# Patient Record
Sex: MALE | Race: WHITE | NOT HISPANIC OR LATINO
[De-identification: names, ages, dates, MRNs, and addresses within clinical notes are randomized per-mention and may not be internally consistent; named-entity substitution may affect disease eponyms.]

---

## 2019-11-21 ENCOUNTER — APPOINTMENT (OUTPATIENT)
Dept: FAMILY MEDICINE | Facility: CLINIC | Age: 84
End: 2019-11-21
Payer: MEDICARE

## 2019-11-21 VITALS
WEIGHT: 186 LBS | BODY MASS INDEX: 25.19 KG/M2 | DIASTOLIC BLOOD PRESSURE: 78 MMHG | HEART RATE: 62 BPM | OXYGEN SATURATION: 95 % | HEIGHT: 72 IN | RESPIRATION RATE: 16 BRPM | SYSTOLIC BLOOD PRESSURE: 132 MMHG

## 2019-11-21 DIAGNOSIS — Z00.00 ENCOUNTER FOR GENERAL ADULT MEDICAL EXAMINATION W/OUT ABNORMAL FINDINGS: ICD-10-CM

## 2019-11-21 DIAGNOSIS — B35.1 TINEA UNGUIUM: ICD-10-CM

## 2019-11-21 PROCEDURE — G0439: CPT

## 2019-11-21 RX ORDER — CICLOPIROX 80 MG/ML
8 SOLUTION TOPICAL TWICE DAILY
Qty: 2 | Refills: 3 | Status: ACTIVE | COMMUNITY
Start: 2019-11-21 | End: 1900-01-01

## 2019-11-21 NOTE — PHYSICAL EXAM
[Supple] : supple [Normal] : no respiratory distress, lungs were clear to auscultation bilaterally and no accessory muscle use

## 2019-11-21 NOTE — ASSESSMENT
[FreeTextEntry1] : Establish care\par Podistry Referral\par [facility] get [facility] that [facility] done at [facility] months ago\par Hx of fall last year. Scalp laceration. Reports ? Intracranial bleed. Seen by Dr Orlando in [place]. Will provide records.

## 2019-11-21 NOTE — HISTORY OF PRESENT ILLNESS
[FreeTextEntry1] : establish care [de-identified] : 89 yr old male here with son and daughter to establish care\cris Needs referral to get Podiatry in the house for home visits.\cris Used to go to [facility] until about a year and half.\cris Has full time aide in home.\par Wife passed away 1 1/2 yr ago\cris Nurse and PA from [facility] come [facility].\par

## 2021-02-17 ENCOUNTER — APPOINTMENT (OUTPATIENT)
Dept: FAMILY MEDICINE | Facility: CLINIC | Age: 86
End: 2021-02-17
Payer: MEDICARE

## 2021-02-17 VITALS
OXYGEN SATURATION: 97 % | SYSTOLIC BLOOD PRESSURE: 130 MMHG | BODY MASS INDEX: 25.47 KG/M2 | HEART RATE: 55 BPM | HEIGHT: 72 IN | RESPIRATION RATE: 14 BRPM | WEIGHT: 188 LBS | TEMPERATURE: 97.6 F | DIASTOLIC BLOOD PRESSURE: 62 MMHG

## 2021-02-17 DIAGNOSIS — F02.80 ALZHEIMER'S DISEASE, UNSPECIFIED: ICD-10-CM

## 2021-02-17 DIAGNOSIS — F03.90 UNSPECIFIED DEMENTIA W/OUT BEHAVIORAL DISTURBANCE: ICD-10-CM

## 2021-02-17 DIAGNOSIS — G30.9 ALZHEIMER'S DISEASE, UNSPECIFIED: ICD-10-CM

## 2021-02-17 DIAGNOSIS — H61.23 IMPACTED CERUMEN, BILATERAL: ICD-10-CM

## 2021-02-17 PROCEDURE — 36415 COLL VENOUS BLD VENIPUNCTURE: CPT

## 2021-02-17 PROCEDURE — G0442 ANNUAL ALCOHOL SCREEN 15 MIN: CPT

## 2021-02-17 PROCEDURE — G0439: CPT

## 2021-02-17 PROCEDURE — 99072 ADDL SUPL MATRL&STAF TM PHE: CPT

## 2021-02-17 NOTE — HISTORY OF PRESENT ILLNESS
[FreeTextEntry1] : annual [de-identified] : Mr. VINCENZO SAUNDERS is a 89 year old male presenting for a check up.\par Here with Daughter who is HCP.\par

## 2021-02-17 NOTE — PHYSICAL EXAM
[Supple] : supple [Normal Rate] : normal rate  [Regular Rhythm] : with a regular rhythm [Normal S1, S2] : normal S1 and S2 [No Edema] : there was no peripheral edema [Normal] : no CVA or spinal tenderness [No Rash] : no rash [Coordination Grossly Intact] : coordination grossly intact [Normal Affect] : the affect was normal [Normal Gait] : normal gait [de-identified] : cerumen impaction b/l [de-identified] : grade 2 murmur [de-identified] : multiple AK/ SK lesions

## 2021-02-17 NOTE — HEALTH RISK ASSESSMENT
[0] : 2) Feeling down, depressed, or hopeless: Not at all (0) [Alone] : lives alone [] :  [Feels Safe at Home] : Feels safe at home [Reports changes in hearing] : Reports changes in hearing [Smoke Detector] : smoke detector [Carbon Monoxide Detector] : carbon monoxide detector [Seat Belt] :  uses seat belt [Reviewed no changes] : Reviewed no changes [Name: ___] : Health Care Proxy's Name: [unfilled]  [Relationship: ___] : Relationship: [unfilled] [Fair] : ~his/her~ current health as fair  [Good] : ~his/her~  mood as  good [No] : In the past 12 months have you used drugs other than those required for medical reasons? No [No falls in past year] : Patient reported no falls in the past year [Change in mental status noted] : Change in mental status noted [Behavioral] : behavioral [Retired] : retired [Fully functional (bathing, dressing, toileting, transferring, walking, feeding)] : Fully functional (bathing, dressing, toileting, transferring, walking, feeding) [] : No [Audit-CScore] : 0 [Sexually Active] : not sexually active [Reports changes in vision] : Reports no changes in vision [Reports changes in dental health] : Reports no changes in dental health [Sunscreen] : does not use sunscreen [de-identified] : has full time aide [de-identified] : has dementia [de-identified] : has full time aide, children paying bills and managing finances [AdvancecareDate] : 2/2021

## 2022-07-05 ENCOUNTER — NON-APPOINTMENT (OUTPATIENT)
Age: 87
End: 2022-07-05

## 2024-01-01 ENCOUNTER — APPOINTMENT (OUTPATIENT)
Dept: UROLOGY | Facility: CLINIC | Age: 89
End: 2024-01-01

## 2024-01-01 ENCOUNTER — INPATIENT (INPATIENT)
Facility: HOSPITAL | Age: 89
LOS: 8 days | DRG: 310 | End: 2024-10-31
Attending: INTERNAL MEDICINE | Admitting: INTERNAL MEDICINE
Payer: MEDICARE

## 2024-01-01 VITALS
DIASTOLIC BLOOD PRESSURE: 91 MMHG | SYSTOLIC BLOOD PRESSURE: 126 MMHG | HEART RATE: 94 BPM | RESPIRATION RATE: 17 BRPM | OXYGEN SATURATION: 93 % | TEMPERATURE: 98 F | HEIGHT: 72 IN

## 2024-01-01 VITALS
RESPIRATION RATE: 14 BRPM | DIASTOLIC BLOOD PRESSURE: 51 MMHG | HEART RATE: 72 BPM | SYSTOLIC BLOOD PRESSURE: 105 MMHG | OXYGEN SATURATION: 99 %

## 2024-01-01 DIAGNOSIS — R09.89 OTHER SPECIFIED SYMPTOMS AND SIGNS INVOLVING THE CIRCULATORY AND RESPIRATORY SYSTEMS: ICD-10-CM

## 2024-01-01 DIAGNOSIS — I48.0 PAROXYSMAL ATRIAL FIBRILLATION: ICD-10-CM

## 2024-01-01 DIAGNOSIS — I48.91 UNSPECIFIED ATRIAL FIBRILLATION: ICD-10-CM

## 2024-01-01 DIAGNOSIS — U07.1 COVID-19: ICD-10-CM

## 2024-01-01 LAB
A1C WITH ESTIMATED AVERAGE GLUCOSE RESULT: 5.6 % — SIGNIFICANT CHANGE UP (ref 4–5.6)
ALBUMIN SERPL ELPH-MCNC: 1.9 G/DL — LOW (ref 3.3–5)
ALBUMIN SERPL ELPH-MCNC: 2 G/DL — LOW (ref 3.3–5)
ALBUMIN SERPL ELPH-MCNC: 2.1 G/DL — LOW (ref 3.3–5)
ALBUMIN SERPL ELPH-MCNC: 2.2 G/DL — LOW (ref 3.3–5)
ALBUMIN SERPL ELPH-MCNC: 2.5 G/DL — LOW (ref 3.3–5)
ALP SERPL-CCNC: 116 U/L — SIGNIFICANT CHANGE UP (ref 40–120)
ALP SERPL-CCNC: 120 U/L — SIGNIFICANT CHANGE UP (ref 40–120)
ALP SERPL-CCNC: 125 U/L — HIGH (ref 40–120)
ALP SERPL-CCNC: 78 U/L — SIGNIFICANT CHANGE UP (ref 40–120)
ALP SERPL-CCNC: 81 U/L — SIGNIFICANT CHANGE UP (ref 40–120)
ALP SERPL-CCNC: 90 U/L — SIGNIFICANT CHANGE UP (ref 40–120)
ALP SERPL-CCNC: 97 U/L — SIGNIFICANT CHANGE UP (ref 40–120)
ALT FLD-CCNC: 109 U/L — HIGH (ref 12–78)
ALT FLD-CCNC: 128 U/L — HIGH (ref 12–78)
ALT FLD-CCNC: 142 U/L — HIGH (ref 12–78)
ALT FLD-CCNC: 166 U/L — HIGH (ref 12–78)
ALT FLD-CCNC: 177 U/L — HIGH (ref 12–78)
ALT FLD-CCNC: 62 U/L — SIGNIFICANT CHANGE UP (ref 12–78)
ALT FLD-CCNC: 72 U/L — SIGNIFICANT CHANGE UP (ref 12–78)
ANION GAP SERPL CALC-SCNC: 10 MMOL/L — SIGNIFICANT CHANGE UP (ref 5–17)
ANION GAP SERPL CALC-SCNC: 5 MMOL/L — SIGNIFICANT CHANGE UP (ref 5–17)
ANION GAP SERPL CALC-SCNC: 5 MMOL/L — SIGNIFICANT CHANGE UP (ref 5–17)
ANION GAP SERPL CALC-SCNC: 6 MMOL/L — SIGNIFICANT CHANGE UP (ref 5–17)
ANION GAP SERPL CALC-SCNC: 8 MMOL/L — SIGNIFICANT CHANGE UP (ref 5–17)
ANION GAP SERPL CALC-SCNC: 9 MMOL/L — SIGNIFICANT CHANGE UP (ref 5–17)
APPEARANCE UR: ABNORMAL
APPEARANCE UR: ABNORMAL
APTT BLD: 33 SEC — SIGNIFICANT CHANGE UP (ref 24.5–35.6)
APTT BLD: 39.2 SEC — HIGH (ref 24.5–35.6)
AST SERPL-CCNC: 116 U/L — HIGH (ref 15–37)
AST SERPL-CCNC: 125 U/L — HIGH (ref 15–37)
AST SERPL-CCNC: 129 U/L — HIGH (ref 15–37)
AST SERPL-CCNC: 147 U/L — HIGH (ref 15–37)
AST SERPL-CCNC: 59 U/L — HIGH (ref 15–37)
AST SERPL-CCNC: 95 U/L — HIGH (ref 15–37)
AST SERPL-CCNC: 97 U/L — HIGH (ref 15–37)
BACTERIA # UR AUTO: ABNORMAL /HPF
BACTERIA # UR AUTO: NEGATIVE /HPF — SIGNIFICANT CHANGE UP
BASOPHILS # BLD AUTO: 0 K/UL — SIGNIFICANT CHANGE UP (ref 0–0.2)
BASOPHILS # BLD AUTO: 0 K/UL — SIGNIFICANT CHANGE UP (ref 0–0.2)
BASOPHILS # BLD AUTO: 0.02 K/UL — SIGNIFICANT CHANGE UP (ref 0–0.2)
BASOPHILS NFR BLD AUTO: 0 % — SIGNIFICANT CHANGE UP (ref 0–2)
BASOPHILS NFR BLD AUTO: 0 % — SIGNIFICANT CHANGE UP (ref 0–2)
BASOPHILS NFR BLD AUTO: 0.2 % — SIGNIFICANT CHANGE UP (ref 0–2)
BILIRUB DIRECT SERPL-MCNC: 0.1 MG/DL — SIGNIFICANT CHANGE UP (ref 0–0.3)
BILIRUB DIRECT SERPL-MCNC: 0.1 MG/DL — SIGNIFICANT CHANGE UP (ref 0–0.3)
BILIRUB DIRECT SERPL-MCNC: 0.2 MG/DL — SIGNIFICANT CHANGE UP (ref 0–0.3)
BILIRUB DIRECT SERPL-MCNC: 0.3 MG/DL — SIGNIFICANT CHANGE UP (ref 0–0.3)
BILIRUB DIRECT SERPL-MCNC: 0.5 MG/DL — HIGH (ref 0–0.3)
BILIRUB INDIRECT FLD-MCNC: 0.3 MG/DL — SIGNIFICANT CHANGE UP (ref 0.2–1)
BILIRUB INDIRECT FLD-MCNC: 0.4 MG/DL — SIGNIFICANT CHANGE UP (ref 0.2–1)
BILIRUB INDIRECT FLD-MCNC: 0.4 MG/DL — SIGNIFICANT CHANGE UP (ref 0.2–1)
BILIRUB INDIRECT FLD-MCNC: 0.5 MG/DL — SIGNIFICANT CHANGE UP (ref 0.2–1)
BILIRUB INDIRECT FLD-MCNC: 0.7 MG/DL — SIGNIFICANT CHANGE UP (ref 0.2–1)
BILIRUB SERPL-MCNC: 0.5 MG/DL — SIGNIFICANT CHANGE UP (ref 0.2–1.2)
BILIRUB SERPL-MCNC: 0.5 MG/DL — SIGNIFICANT CHANGE UP (ref 0.2–1.2)
BILIRUB SERPL-MCNC: 0.6 MG/DL — SIGNIFICANT CHANGE UP (ref 0.2–1.2)
BILIRUB SERPL-MCNC: 0.6 MG/DL — SIGNIFICANT CHANGE UP (ref 0.2–1.2)
BILIRUB SERPL-MCNC: 0.7 MG/DL — SIGNIFICANT CHANGE UP (ref 0.2–1.2)
BILIRUB SERPL-MCNC: 1.2 MG/DL — SIGNIFICANT CHANGE UP (ref 0.2–1.2)
BILIRUB SERPL-MCNC: 2.3 MG/DL — HIGH (ref 0.2–1.2)
BILIRUB UR-MCNC: NEGATIVE — SIGNIFICANT CHANGE UP
BILIRUB UR-MCNC: NEGATIVE — SIGNIFICANT CHANGE UP
BLD GP AB SCN SERPL QL: SIGNIFICANT CHANGE UP
BUN SERPL-MCNC: 136 MG/DL — HIGH (ref 7–23)
BUN SERPL-MCNC: 148 MG/DL — HIGH (ref 7–23)
BUN SERPL-MCNC: 157 MG/DL — HIGH (ref 7–23)
BUN SERPL-MCNC: 175 MG/DL — HIGH (ref 7–23)
BUN SERPL-MCNC: 24 MG/DL — HIGH (ref 7–23)
BUN SERPL-MCNC: 47 MG/DL — HIGH (ref 7–23)
BUN SERPL-MCNC: 75 MG/DL — HIGH (ref 7–23)
BUN SERPL-MCNC: 96 MG/DL — HIGH (ref 7–23)
CALCIUM SERPL-MCNC: 7.8 MG/DL — LOW (ref 8.5–10.1)
CALCIUM SERPL-MCNC: 8.2 MG/DL — LOW (ref 8.5–10.1)
CALCIUM SERPL-MCNC: 8.8 MG/DL — SIGNIFICANT CHANGE UP (ref 8.5–10.1)
CALCIUM SERPL-MCNC: 9 MG/DL — SIGNIFICANT CHANGE UP (ref 8.5–10.1)
CAST: 1 /LPF — SIGNIFICANT CHANGE UP (ref 0–4)
CAST: 15 /LPF — HIGH (ref 0–4)
CHLORIDE SERPL-SCNC: 110 MMOL/L — HIGH (ref 96–108)
CHLORIDE SERPL-SCNC: 113 MMOL/L — HIGH (ref 96–108)
CHLORIDE SERPL-SCNC: 118 MMOL/L — HIGH (ref 96–108)
CHLORIDE SERPL-SCNC: 122 MMOL/L — HIGH (ref 96–108)
CHLORIDE SERPL-SCNC: 124 MMOL/L — HIGH (ref 96–108)
CHLORIDE SERPL-SCNC: 124 MMOL/L — HIGH (ref 96–108)
CHLORIDE SERPL-SCNC: 126 MMOL/L — HIGH (ref 96–108)
CHLORIDE SERPL-SCNC: 129 MMOL/L — HIGH (ref 96–108)
CK SERPL-CCNC: 41 U/L — SIGNIFICANT CHANGE UP (ref 26–308)
CO2 SERPL-SCNC: 18 MMOL/L — LOW (ref 22–31)
CO2 SERPL-SCNC: 19 MMOL/L — LOW (ref 22–31)
CO2 SERPL-SCNC: 19 MMOL/L — LOW (ref 22–31)
CO2 SERPL-SCNC: 20 MMOL/L — LOW (ref 22–31)
CO2 SERPL-SCNC: 22 MMOL/L — SIGNIFICANT CHANGE UP (ref 22–31)
CO2 SERPL-SCNC: 22 MMOL/L — SIGNIFICANT CHANGE UP (ref 22–31)
CO2 SERPL-SCNC: 24 MMOL/L — SIGNIFICANT CHANGE UP (ref 22–31)
CO2 SERPL-SCNC: 27 MMOL/L — SIGNIFICANT CHANGE UP (ref 22–31)
COLOR SPEC: YELLOW — SIGNIFICANT CHANGE UP
COLOR SPEC: YELLOW — SIGNIFICANT CHANGE UP
COMMENT - URINE: SIGNIFICANT CHANGE UP
CREAT SERPL-MCNC: 1.25 MG/DL — SIGNIFICANT CHANGE UP (ref 0.5–1.3)
CREAT SERPL-MCNC: 1.26 MG/DL — SIGNIFICANT CHANGE UP (ref 0.5–1.3)
CREAT SERPL-MCNC: 1.5 MG/DL — HIGH (ref 0.5–1.3)
CREAT SERPL-MCNC: 1.69 MG/DL — HIGH (ref 0.5–1.3)
CREAT SERPL-MCNC: 1.73 MG/DL — HIGH (ref 0.5–1.3)
CREAT SERPL-MCNC: 1.98 MG/DL — HIGH (ref 0.5–1.3)
CREAT SERPL-MCNC: 2.18 MG/DL — HIGH (ref 0.5–1.3)
CREAT SERPL-MCNC: 2.37 MG/DL — HIGH (ref 0.5–1.3)
CREAT SERPL-MCNC: 2.68 MG/DL — HIGH (ref 0.5–1.3)
CREAT SERPL-MCNC: 2.78 MG/DL — HIGH (ref 0.5–1.3)
CRP SERPL-MCNC: 144 MG/ML — HIGH (ref 0–5)
CULTURE RESULTS: SIGNIFICANT CHANGE UP
CULTURE RESULTS: SIGNIFICANT CHANGE UP
D DIMER BLD IA.RAPID-MCNC: 1901 NG/ML DDU — HIGH
D DIMER BLD IA.RAPID-MCNC: 2204 NG/ML DDU — HIGH
DIFF PNL FLD: ABNORMAL
DIFF PNL FLD: ABNORMAL
EGFR: 20 ML/MIN/1.73M2 — LOW
EGFR: 21 ML/MIN/1.73M2 — LOW
EGFR: 24 ML/MIN/1.73M2 — LOW
EGFR: 27 ML/MIN/1.73M2 — LOW
EGFR: 30 ML/MIN/1.73M2 — LOW
EGFR: 35 ML/MIN/1.73M2 — LOW
EGFR: 36 ML/MIN/1.73M2 — LOW
EGFR: 42 ML/MIN/1.73M2 — LOW
EGFR: 52 ML/MIN/1.73M2 — LOW
EGFR: 52 ML/MIN/1.73M2 — LOW
EOSINOPHIL # BLD AUTO: 0 K/UL — SIGNIFICANT CHANGE UP (ref 0–0.5)
EOSINOPHIL # BLD AUTO: 0 K/UL — SIGNIFICANT CHANGE UP (ref 0–0.5)
EOSINOPHIL # BLD AUTO: 0.07 K/UL — SIGNIFICANT CHANGE UP (ref 0–0.5)
EOSINOPHIL NFR BLD AUTO: 0 % — SIGNIFICANT CHANGE UP (ref 0–6)
EOSINOPHIL NFR BLD AUTO: 0 % — SIGNIFICANT CHANGE UP (ref 0–6)
EOSINOPHIL NFR BLD AUTO: 0.7 % — SIGNIFICANT CHANGE UP (ref 0–6)
ESTIMATED AVERAGE GLUCOSE: 114 MG/DL — SIGNIFICANT CHANGE UP (ref 68–114)
FERRITIN SERPL-MCNC: 1175 NG/ML — HIGH (ref 30–400)
FLUAV AG NPH QL: SIGNIFICANT CHANGE UP
FLUBV AG NPH QL: SIGNIFICANT CHANGE UP
GLUCOSE BLDC GLUCOMTR-MCNC: 148 MG/DL — HIGH (ref 70–99)
GLUCOSE BLDC GLUCOMTR-MCNC: 155 MG/DL — HIGH (ref 70–99)
GLUCOSE BLDC GLUCOMTR-MCNC: 162 MG/DL — HIGH (ref 70–99)
GLUCOSE SERPL-MCNC: 123 MG/DL — HIGH (ref 70–99)
GLUCOSE SERPL-MCNC: 133 MG/DL — HIGH (ref 70–99)
GLUCOSE SERPL-MCNC: 139 MG/DL — HIGH (ref 70–99)
GLUCOSE SERPL-MCNC: 140 MG/DL — HIGH (ref 70–99)
GLUCOSE SERPL-MCNC: 151 MG/DL — HIGH (ref 70–99)
GLUCOSE SERPL-MCNC: 156 MG/DL — HIGH (ref 70–99)
GLUCOSE SERPL-MCNC: 171 MG/DL — HIGH (ref 70–99)
GLUCOSE SERPL-MCNC: 179 MG/DL — HIGH (ref 70–99)
GLUCOSE UR QL: NEGATIVE MG/DL — SIGNIFICANT CHANGE UP
GLUCOSE UR QL: NEGATIVE MG/DL — SIGNIFICANT CHANGE UP
HAV IGM SER-ACNC: SIGNIFICANT CHANGE UP
HBV CORE IGM SER-ACNC: SIGNIFICANT CHANGE UP
HBV SURFACE AG SER-ACNC: SIGNIFICANT CHANGE UP
HCT VFR BLD CALC: 19.8 % — CRITICAL LOW (ref 39–50)
HCT VFR BLD CALC: 20 % — CRITICAL LOW (ref 39–50)
HCT VFR BLD CALC: 33.1 % — LOW (ref 39–50)
HCT VFR BLD CALC: 37 % — LOW (ref 39–50)
HCT VFR BLD CALC: 40.1 % — SIGNIFICANT CHANGE UP (ref 39–50)
HCT VFR BLD CALC: 43.1 % — SIGNIFICANT CHANGE UP (ref 39–50)
HCV AB S/CO SERPL IA: 0.09 S/CO — SIGNIFICANT CHANGE UP (ref 0–0.99)
HCV AB SERPL-IMP: SIGNIFICANT CHANGE UP
HGB BLD-MCNC: 10.6 G/DL — LOW (ref 13–17)
HGB BLD-MCNC: 11.9 G/DL — LOW (ref 13–17)
HGB BLD-MCNC: 12.8 G/DL — LOW (ref 13–17)
HGB BLD-MCNC: 13.8 G/DL — SIGNIFICANT CHANGE UP (ref 13–17)
HGB BLD-MCNC: 6.5 G/DL — CRITICAL LOW (ref 13–17)
HGB BLD-MCNC: 6.6 G/DL — CRITICAL LOW (ref 13–17)
HIV 1 & 2 AB SERPL IA.RAPID: SIGNIFICANT CHANGE UP
HIV 1+2 AB+HIV1 P24 AG SERPL QL IA: SIGNIFICANT CHANGE UP
IMM GRANULOCYTES NFR BLD AUTO: 0.4 % — SIGNIFICANT CHANGE UP (ref 0–0.9)
INR BLD: 1.29 RATIO — HIGH (ref 0.85–1.16)
INR BLD: 1.29 RATIO — HIGH (ref 0.85–1.16)
INR BLD: 1.31 RATIO — HIGH (ref 0.85–1.16)
INR BLD: 1.49 RATIO — HIGH (ref 0.85–1.16)
INR BLD: 1.53 RATIO — HIGH (ref 0.85–1.16)
INR BLD: 1.57 RATIO — HIGH (ref 0.85–1.16)
INR BLD: 1.74 RATIO — HIGH (ref 0.85–1.16)
INR BLD: 1.82 RATIO — HIGH (ref 0.85–1.16)
INR BLD: 1.82 RATIO — HIGH (ref 0.85–1.16)
KETONES UR-MCNC: NEGATIVE MG/DL — SIGNIFICANT CHANGE UP
KETONES UR-MCNC: NEGATIVE MG/DL — SIGNIFICANT CHANGE UP
LACTATE SERPL-SCNC: 1.8 MMOL/L — SIGNIFICANT CHANGE UP (ref 0.7–2)
LACTATE SERPL-SCNC: 2.9 MMOL/L — HIGH (ref 0.7–2)
LACTATE SERPL-SCNC: 3.1 MMOL/L — HIGH (ref 0.7–2)
LEUKOCYTE ESTERASE UR-ACNC: ABNORMAL
LEUKOCYTE ESTERASE UR-ACNC: ABNORMAL
LYMPHOCYTES # BLD AUTO: 0.64 K/UL — LOW (ref 1–3.3)
LYMPHOCYTES # BLD AUTO: 12 % — LOW (ref 13–44)
LYMPHOCYTES # BLD AUTO: 14 % — SIGNIFICANT CHANGE UP (ref 13–44)
LYMPHOCYTES # BLD AUTO: 3.04 K/UL — SIGNIFICANT CHANGE UP (ref 1–3.3)
LYMPHOCYTES # BLD AUTO: 3.28 K/UL — SIGNIFICANT CHANGE UP (ref 1–3.3)
LYMPHOCYTES # BLD AUTO: 6.2 % — LOW (ref 13–44)
MACROCYTES BLD QL: SLIGHT — SIGNIFICANT CHANGE UP
MAGNESIUM SERPL-MCNC: 2.5 MG/DL — SIGNIFICANT CHANGE UP (ref 1.6–2.6)
MAGNESIUM SERPL-MCNC: 2.5 MG/DL — SIGNIFICANT CHANGE UP (ref 1.6–2.6)
MAGNESIUM SERPL-MCNC: 2.6 MG/DL — SIGNIFICANT CHANGE UP (ref 1.6–2.6)
MAGNESIUM SERPL-MCNC: 2.6 MG/DL — SIGNIFICANT CHANGE UP (ref 1.6–2.6)
MAGNESIUM SERPL-MCNC: 2.8 MG/DL — HIGH (ref 1.6–2.6)
MANUAL SMEAR VERIFICATION: SIGNIFICANT CHANGE UP
MANUAL SMEAR VERIFICATION: SIGNIFICANT CHANGE UP
MCHC RBC-ENTMCNC: 29.9 PG — SIGNIFICANT CHANGE UP (ref 27–34)
MCHC RBC-ENTMCNC: 30.1 PG — SIGNIFICANT CHANGE UP (ref 27–34)
MCHC RBC-ENTMCNC: 30.7 PG — SIGNIFICANT CHANGE UP (ref 27–34)
MCHC RBC-ENTMCNC: 31.3 PG — SIGNIFICANT CHANGE UP (ref 27–34)
MCHC RBC-ENTMCNC: 31.9 GM/DL — LOW (ref 32–36)
MCHC RBC-ENTMCNC: 32 GM/DL — SIGNIFICANT CHANGE UP (ref 32–36)
MCHC RBC-ENTMCNC: 32 GM/DL — SIGNIFICANT CHANGE UP (ref 32–36)
MCHC RBC-ENTMCNC: 32.2 GM/DL — SIGNIFICANT CHANGE UP (ref 32–36)
MCHC RBC-ENTMCNC: 32.5 GM/DL — SIGNIFICANT CHANGE UP (ref 32–36)
MCHC RBC-ENTMCNC: 33.3 GM/DL — SIGNIFICANT CHANGE UP (ref 32–36)
MCV RBC AUTO: 93.7 FL — SIGNIFICANT CHANGE UP (ref 80–100)
MCV RBC AUTO: 93.7 FL — SIGNIFICANT CHANGE UP (ref 80–100)
MCV RBC AUTO: 93.8 FL — SIGNIFICANT CHANGE UP (ref 80–100)
MCV RBC AUTO: 93.9 FL — SIGNIFICANT CHANGE UP (ref 80–100)
MCV RBC AUTO: 94 FL — SIGNIFICANT CHANGE UP (ref 80–100)
MCV RBC AUTO: 94.3 FL — SIGNIFICANT CHANGE UP (ref 80–100)
MONOCYTES # BLD AUTO: 0.76 K/UL — SIGNIFICANT CHANGE UP (ref 0–0.9)
MONOCYTES # BLD AUTO: 0.83 K/UL — SIGNIFICANT CHANGE UP (ref 0–0.9)
MONOCYTES # BLD AUTO: 1.64 K/UL — HIGH (ref 0–0.9)
MONOCYTES NFR BLD AUTO: 3 % — SIGNIFICANT CHANGE UP (ref 2–14)
MONOCYTES NFR BLD AUTO: 7 % — SIGNIFICANT CHANGE UP (ref 2–14)
MONOCYTES NFR BLD AUTO: 8.1 % — SIGNIFICANT CHANGE UP (ref 2–14)
MRSA PCR RESULT.: SIGNIFICANT CHANGE UP
NEUTROPHILS # BLD AUTO: 18.51 K/UL — HIGH (ref 1.8–7.4)
NEUTROPHILS # BLD AUTO: 21.51 K/UL — HIGH (ref 1.8–7.4)
NEUTROPHILS # BLD AUTO: 8.66 K/UL — HIGH (ref 1.8–7.4)
NEUTROPHILS NFR BLD AUTO: 78 % — HIGH (ref 43–77)
NEUTROPHILS NFR BLD AUTO: 84.4 % — HIGH (ref 43–77)
NEUTROPHILS NFR BLD AUTO: 85 % — HIGH (ref 43–77)
NEUTS BAND # BLD: 1 % — SIGNIFICANT CHANGE UP (ref 0–8)
NITRITE UR-MCNC: NEGATIVE — SIGNIFICANT CHANGE UP
NITRITE UR-MCNC: NEGATIVE — SIGNIFICANT CHANGE UP
NRBC # BLD: 3 /100 WBCS — HIGH (ref 0–0)
NRBC # BLD: 8 /100 WBCS — HIGH (ref 0–0)
NRBC # BLD: SIGNIFICANT CHANGE UP /100 WBCS (ref 0–0)
NRBC # BLD: SIGNIFICANT CHANGE UP /100 WBCS (ref 0–0)
PH UR: 5 — SIGNIFICANT CHANGE UP (ref 5–8)
PH UR: 5 — SIGNIFICANT CHANGE UP (ref 5–8)
PHOSPHATE SERPL-MCNC: 2.4 MG/DL — LOW (ref 2.5–4.5)
PHOSPHATE SERPL-MCNC: 2.9 MG/DL — SIGNIFICANT CHANGE UP (ref 2.5–4.5)
PHOSPHATE SERPL-MCNC: 3.2 MG/DL — SIGNIFICANT CHANGE UP (ref 2.5–4.5)
PHOSPHATE SERPL-MCNC: 3.4 MG/DL — SIGNIFICANT CHANGE UP (ref 2.5–4.5)
PHOSPHATE SERPL-MCNC: 3.7 MG/DL — SIGNIFICANT CHANGE UP (ref 2.5–4.5)
PLAT MORPH BLD: NORMAL — SIGNIFICANT CHANGE UP
PLAT MORPH BLD: NORMAL — SIGNIFICANT CHANGE UP
PLATELET # BLD AUTO: 212 K/UL — SIGNIFICANT CHANGE UP (ref 150–400)
PLATELET # BLD AUTO: 226 K/UL — SIGNIFICANT CHANGE UP (ref 150–400)
PLATELET # BLD AUTO: 256 K/UL — SIGNIFICANT CHANGE UP (ref 150–400)
PLATELET # BLD AUTO: 266 K/UL — SIGNIFICANT CHANGE UP (ref 150–400)
PLATELET # BLD AUTO: 277 K/UL — SIGNIFICANT CHANGE UP (ref 150–400)
PLATELET # BLD AUTO: 303 K/UL — SIGNIFICANT CHANGE UP (ref 150–400)
POLYCHROMASIA BLD QL SMEAR: SLIGHT — SIGNIFICANT CHANGE UP
POTASSIUM SERPL-MCNC: 3.5 MMOL/L — SIGNIFICANT CHANGE UP (ref 3.5–5.3)
POTASSIUM SERPL-MCNC: 3.8 MMOL/L — SIGNIFICANT CHANGE UP (ref 3.5–5.3)
POTASSIUM SERPL-MCNC: 3.8 MMOL/L — SIGNIFICANT CHANGE UP (ref 3.5–5.3)
POTASSIUM SERPL-MCNC: 3.9 MMOL/L — SIGNIFICANT CHANGE UP (ref 3.5–5.3)
POTASSIUM SERPL-MCNC: 4.1 MMOL/L — SIGNIFICANT CHANGE UP (ref 3.5–5.3)
POTASSIUM SERPL-MCNC: 4.4 MMOL/L — SIGNIFICANT CHANGE UP (ref 3.5–5.3)
POTASSIUM SERPL-MCNC: 4.7 MMOL/L — SIGNIFICANT CHANGE UP (ref 3.5–5.3)
POTASSIUM SERPL-MCNC: 5.5 MMOL/L — HIGH (ref 3.5–5.3)
POTASSIUM SERPL-SCNC: 3.5 MMOL/L — SIGNIFICANT CHANGE UP (ref 3.5–5.3)
POTASSIUM SERPL-SCNC: 3.8 MMOL/L — SIGNIFICANT CHANGE UP (ref 3.5–5.3)
POTASSIUM SERPL-SCNC: 3.8 MMOL/L — SIGNIFICANT CHANGE UP (ref 3.5–5.3)
POTASSIUM SERPL-SCNC: 3.9 MMOL/L — SIGNIFICANT CHANGE UP (ref 3.5–5.3)
POTASSIUM SERPL-SCNC: 4.1 MMOL/L — SIGNIFICANT CHANGE UP (ref 3.5–5.3)
POTASSIUM SERPL-SCNC: 4.4 MMOL/L — SIGNIFICANT CHANGE UP (ref 3.5–5.3)
POTASSIUM SERPL-SCNC: 4.7 MMOL/L — SIGNIFICANT CHANGE UP (ref 3.5–5.3)
POTASSIUM SERPL-SCNC: 5.5 MMOL/L — HIGH (ref 3.5–5.3)
PROCALCITONIN SERPL-MCNC: 0.41 NG/ML — HIGH (ref 0.02–0.1)
PROLACTIN SERPL-MCNC: 64.2 NG/ML — HIGH (ref 4.1–18.4)
PROT SERPL-MCNC: 5.1 GM/DL — LOW (ref 6–8.3)
PROT SERPL-MCNC: 5.2 GM/DL — LOW (ref 6–8.3)
PROT SERPL-MCNC: 5.7 GM/DL — LOW (ref 6–8.3)
PROT SERPL-MCNC: 6.3 GM/DL — SIGNIFICANT CHANGE UP (ref 6–8.3)
PROT SERPL-MCNC: 6.6 GM/DL — SIGNIFICANT CHANGE UP (ref 6–8.3)
PROT SERPL-MCNC: 6.8 GM/DL — SIGNIFICANT CHANGE UP (ref 6–8.3)
PROT SERPL-MCNC: 7.1 GM/DL — SIGNIFICANT CHANGE UP (ref 6–8.3)
PROT UR-MCNC: NEGATIVE MG/DL — SIGNIFICANT CHANGE UP
PROT UR-MCNC: SIGNIFICANT CHANGE UP MG/DL
PROTHROM AB SERPL-ACNC: 14.8 SEC — HIGH (ref 9.9–13.4)
PROTHROM AB SERPL-ACNC: 15.2 SEC — HIGH (ref 9.9–13.4)
PROTHROM AB SERPL-ACNC: 15.4 SEC — HIGH (ref 9.9–13.4)
PROTHROM AB SERPL-ACNC: 17.5 SEC — HIGH (ref 9.9–13.4)
PROTHROM AB SERPL-ACNC: 17.5 SEC — HIGH (ref 9.9–13.4)
PROTHROM AB SERPL-ACNC: 18 SEC — HIGH (ref 9.9–13.4)
PROTHROM AB SERPL-ACNC: 20.4 SEC — HIGH (ref 9.9–13.4)
PROTHROM AB SERPL-ACNC: 20.8 SEC — HIGH (ref 9.9–13.4)
PROTHROM AB SERPL-ACNC: 20.8 SEC — HIGH (ref 9.9–13.4)
RBC # BLD: 2.11 M/UL — LOW (ref 4.2–5.8)
RBC # BLD: 2.12 M/UL — LOW (ref 4.2–5.8)
RBC # BLD: 3.52 M/UL — LOW (ref 4.2–5.8)
RBC # BLD: 3.95 M/UL — LOW (ref 4.2–5.8)
RBC # BLD: 4.28 M/UL — SIGNIFICANT CHANGE UP (ref 4.2–5.8)
RBC # BLD: 4.59 M/UL — SIGNIFICANT CHANGE UP (ref 4.2–5.8)
RBC # FLD: 13.9 % — SIGNIFICANT CHANGE UP (ref 10.3–14.5)
RBC # FLD: 14.1 % — SIGNIFICANT CHANGE UP (ref 10.3–14.5)
RBC # FLD: 14.2 % — SIGNIFICANT CHANGE UP (ref 10.3–14.5)
RBC # FLD: 14.4 % — SIGNIFICANT CHANGE UP (ref 10.3–14.5)
RBC # FLD: 14.9 % — HIGH (ref 10.3–14.5)
RBC # FLD: 14.9 % — HIGH (ref 10.3–14.5)
RBC BLD AUTO: ABNORMAL
RBC BLD AUTO: NORMAL — SIGNIFICANT CHANGE UP
RBC CASTS # UR COMP ASSIST: 8 /HPF — HIGH (ref 0–4)
RBC CASTS # UR COMP ASSIST: 8 /HPF — HIGH (ref 0–4)
RSV RNA NPH QL NAA+NON-PROBE: SIGNIFICANT CHANGE UP
S AUREUS DNA NOSE QL NAA+PROBE: SIGNIFICANT CHANGE UP
SARS-COV-2 RNA SPEC QL NAA+PROBE: DETECTED
SODIUM SERPL-SCNC: 142 MMOL/L — SIGNIFICANT CHANGE UP (ref 135–145)
SODIUM SERPL-SCNC: 143 MMOL/L — SIGNIFICANT CHANGE UP (ref 135–145)
SODIUM SERPL-SCNC: 145 MMOL/L — SIGNIFICANT CHANGE UP (ref 135–145)
SODIUM SERPL-SCNC: 152 MMOL/L — HIGH (ref 135–145)
SODIUM SERPL-SCNC: 152 MMOL/L — HIGH (ref 135–145)
SODIUM SERPL-SCNC: 153 MMOL/L — HIGH (ref 135–145)
SODIUM SERPL-SCNC: 156 MMOL/L — HIGH (ref 135–145)
SODIUM SERPL-SCNC: 157 MMOL/L — HIGH (ref 135–145)
SP GR SPEC: 1.02 — SIGNIFICANT CHANGE UP (ref 1–1.03)
SP GR SPEC: 1.02 — SIGNIFICANT CHANGE UP (ref 1–1.03)
SPECIMEN SOURCE: SIGNIFICANT CHANGE UP
SPECIMEN SOURCE: SIGNIFICANT CHANGE UP
SQUAMOUS # UR AUTO: 1 /HPF — SIGNIFICANT CHANGE UP (ref 0–5)
SQUAMOUS # UR AUTO: 12 /HPF — HIGH (ref 0–5)
TROPONIN I, HIGH SENSITIVITY RESULT: 15.56 NG/L — SIGNIFICANT CHANGE UP
TROPONIN I, HIGH SENSITIVITY RESULT: 161.77 NG/L — HIGH
TROPONIN I, HIGH SENSITIVITY RESULT: 21.16 NG/L — SIGNIFICANT CHANGE UP
UROBILINOGEN FLD QL: 0.2 MG/DL — SIGNIFICANT CHANGE UP (ref 0.2–1)
UROBILINOGEN FLD QL: 0.2 MG/DL — SIGNIFICANT CHANGE UP (ref 0.2–1)
WBC # BLD: 10.26 K/UL — SIGNIFICANT CHANGE UP (ref 3.8–10.5)
WBC # BLD: 11 K/UL — HIGH (ref 3.8–10.5)
WBC # BLD: 23.43 K/UL — HIGH (ref 3.8–10.5)
WBC # BLD: 25.3 K/UL — HIGH (ref 3.8–10.5)
WBC # BLD: 9.08 K/UL — SIGNIFICANT CHANGE UP (ref 3.8–10.5)
WBC # BLD: 9.92 K/UL — SIGNIFICANT CHANGE UP (ref 3.8–10.5)
WBC # FLD AUTO: 10.26 K/UL — SIGNIFICANT CHANGE UP (ref 3.8–10.5)
WBC # FLD AUTO: 11 K/UL — HIGH (ref 3.8–10.5)
WBC # FLD AUTO: 23.43 K/UL — HIGH (ref 3.8–10.5)
WBC # FLD AUTO: 25.3 K/UL — HIGH (ref 3.8–10.5)
WBC # FLD AUTO: 9.08 K/UL — SIGNIFICANT CHANGE UP (ref 3.8–10.5)
WBC # FLD AUTO: 9.92 K/UL — SIGNIFICANT CHANGE UP (ref 3.8–10.5)
WBC UR QL: 3 /HPF — SIGNIFICANT CHANGE UP (ref 0–5)
WBC UR QL: 41 /HPF — HIGH (ref 0–5)

## 2024-01-01 PROCEDURE — 36430 TRANSFUSION BLD/BLD COMPNT: CPT

## 2024-01-01 PROCEDURE — 86901 BLOOD TYPING SEROLOGIC RH(D): CPT

## 2024-01-01 PROCEDURE — 83036 HEMOGLOBIN GLYCOSYLATED A1C: CPT

## 2024-01-01 PROCEDURE — 99232 SBSQ HOSP IP/OBS MODERATE 35: CPT

## 2024-01-01 PROCEDURE — 82728 ASSAY OF FERRITIN: CPT

## 2024-01-01 PROCEDURE — 99291 CRITICAL CARE FIRST HOUR: CPT

## 2024-01-01 PROCEDURE — 99223 1ST HOSP IP/OBS HIGH 75: CPT

## 2024-01-01 PROCEDURE — 71045 X-RAY EXAM CHEST 1 VIEW: CPT | Mod: 26

## 2024-01-01 PROCEDURE — 83605 ASSAY OF LACTIC ACID: CPT

## 2024-01-01 PROCEDURE — 84484 ASSAY OF TROPONIN QUANT: CPT

## 2024-01-01 PROCEDURE — 85610 PROTHROMBIN TIME: CPT

## 2024-01-01 PROCEDURE — 99233 SBSQ HOSP IP/OBS HIGH 50: CPT

## 2024-01-01 PROCEDURE — 87389 HIV-1 AG W/HIV-1&-2 AB AG IA: CPT

## 2024-01-01 PROCEDURE — 86140 C-REACTIVE PROTEIN: CPT

## 2024-01-01 PROCEDURE — 87040 BLOOD CULTURE FOR BACTERIA: CPT

## 2024-01-01 PROCEDURE — 97530 THERAPEUTIC ACTIVITIES: CPT | Mod: GP

## 2024-01-01 PROCEDURE — 80076 HEPATIC FUNCTION PANEL: CPT

## 2024-01-01 PROCEDURE — 97110 THERAPEUTIC EXERCISES: CPT | Mod: GP

## 2024-01-01 PROCEDURE — 70450 CT HEAD/BRAIN W/O DYE: CPT | Mod: MC

## 2024-01-01 PROCEDURE — 97116 GAIT TRAINING THERAPY: CPT | Mod: GP

## 2024-01-01 PROCEDURE — 86703 HIV-1/HIV-2 1 RESULT ANTBDY: CPT

## 2024-01-01 PROCEDURE — 86850 RBC ANTIBODY SCREEN: CPT

## 2024-01-01 PROCEDURE — 71045 X-RAY EXAM CHEST 1 VIEW: CPT

## 2024-01-01 PROCEDURE — 97162 PT EVAL MOD COMPLEX 30 MIN: CPT | Mod: GP

## 2024-01-01 PROCEDURE — 80074 ACUTE HEPATITIS PANEL: CPT

## 2024-01-01 PROCEDURE — 70450 CT HEAD/BRAIN W/O DYE: CPT | Mod: 26

## 2024-01-01 PROCEDURE — 87640 STAPH A DNA AMP PROBE: CPT

## 2024-01-01 PROCEDURE — 82248 BILIRUBIN DIRECT: CPT

## 2024-01-01 PROCEDURE — 85379 FIBRIN DEGRADATION QUANT: CPT

## 2024-01-01 PROCEDURE — 85025 COMPLETE CBC W/AUTO DIFF WBC: CPT

## 2024-01-01 PROCEDURE — 93010 ELECTROCARDIOGRAM REPORT: CPT

## 2024-01-01 PROCEDURE — 86900 BLOOD TYPING SEROLOGIC ABO: CPT

## 2024-01-01 PROCEDURE — 71275 CT ANGIOGRAPHY CHEST: CPT | Mod: 26

## 2024-01-01 PROCEDURE — 85027 COMPLETE CBC AUTOMATED: CPT

## 2024-01-01 PROCEDURE — 84145 PROCALCITONIN (PCT): CPT

## 2024-01-01 PROCEDURE — 99497 ADVNCD CARE PLAN 30 MIN: CPT | Mod: 25

## 2024-01-01 PROCEDURE — 82550 ASSAY OF CK (CPK): CPT

## 2024-01-01 PROCEDURE — 84146 ASSAY OF PROLACTIN: CPT

## 2024-01-01 PROCEDURE — 99231 SBSQ HOSP IP/OBS SF/LOW 25: CPT

## 2024-01-01 PROCEDURE — P9016: CPT

## 2024-01-01 PROCEDURE — 84100 ASSAY OF PHOSPHORUS: CPT

## 2024-01-01 PROCEDURE — 85730 THROMBOPLASTIN TIME PARTIAL: CPT

## 2024-01-01 PROCEDURE — 87641 MR-STAPH DNA AMP PROBE: CPT

## 2024-01-01 PROCEDURE — 36415 COLL VENOUS BLD VENIPUNCTURE: CPT

## 2024-01-01 PROCEDURE — 99285 EMERGENCY DEPT VISIT HI MDM: CPT

## 2024-01-01 PROCEDURE — 71275 CT ANGIOGRAPHY CHEST: CPT | Mod: MC

## 2024-01-01 PROCEDURE — 82962 GLUCOSE BLOOD TEST: CPT

## 2024-01-01 PROCEDURE — 81001 URINALYSIS AUTO W/SCOPE: CPT

## 2024-01-01 PROCEDURE — 86923 COMPATIBILITY TEST ELECTRIC: CPT

## 2024-01-01 PROCEDURE — 82565 ASSAY OF CREATININE: CPT

## 2024-01-01 PROCEDURE — 80048 BASIC METABOLIC PNL TOTAL CA: CPT

## 2024-01-01 PROCEDURE — 83735 ASSAY OF MAGNESIUM: CPT

## 2024-01-01 PROCEDURE — 80053 COMPREHEN METABOLIC PANEL: CPT

## 2024-01-01 RX ORDER — PHENYLEPHRINE HCL IN 0.9% NACL 20MG/250ML
0.1 PLASTIC BAG, INJECTION (ML) INTRAVENOUS
Qty: 40 | Refills: 0 | Status: DISCONTINUED | OUTPATIENT
Start: 2024-01-01 | End: 2024-01-01

## 2024-01-01 RX ORDER — MORPHINE SULFATE 30 MG/1
2 TABLET, EXTENDED RELEASE ORAL
Qty: 100 | Refills: 0 | Status: DISCONTINUED | OUTPATIENT
Start: 2024-01-01 | End: 2024-01-01

## 2024-01-01 RX ORDER — OXYCODONE HYDROCHLORIDE 30 MG/1
5 TABLET ORAL EVERY 6 HOURS
Refills: 0 | Status: DISCONTINUED | OUTPATIENT
Start: 2024-01-01 | End: 2024-01-01

## 2024-01-01 RX ORDER — DOXYCYCLINE HYCLATE 100 MG/1
100 TABLET, FILM COATED ORAL ONCE
Refills: 0 | Status: COMPLETED | OUTPATIENT
Start: 2024-01-01 | End: 2024-01-01

## 2024-01-01 RX ORDER — MELATONIN 5 MG
3 TABLET ORAL AT BEDTIME
Refills: 0 | Status: DISCONTINUED | OUTPATIENT
Start: 2024-01-01 | End: 2024-01-01

## 2024-01-01 RX ORDER — POLYETHYLENE GLYCOL 3350 17 G/17G
17 POWDER, FOR SOLUTION ORAL AT BEDTIME
Refills: 0 | Status: DISCONTINUED | OUTPATIENT
Start: 2024-01-01 | End: 2024-01-01

## 2024-01-01 RX ORDER — DOXYCYCLINE HYCLATE 100 MG/1
100 TABLET, FILM COATED ORAL EVERY 12 HOURS
Refills: 0 | Status: DISCONTINUED | OUTPATIENT
Start: 2024-01-01 | End: 2024-01-01

## 2024-01-01 RX ORDER — ENOXAPARIN SODIUM 40MG/0.4ML
30 SYRINGE (ML) SUBCUTANEOUS EVERY 24 HOURS
Refills: 0 | Status: DISCONTINUED | OUTPATIENT
Start: 2024-01-01 | End: 2024-01-01

## 2024-01-01 RX ORDER — THIAMINE HCL 100 MG
100 TABLET ORAL AT BEDTIME
Refills: 0 | Status: DISCONTINUED | OUTPATIENT
Start: 2024-01-01 | End: 2024-01-01

## 2024-01-01 RX ORDER — FINASTERIDE 5 MG/1
5 TABLET, FILM COATED ORAL DAILY
Refills: 0 | Status: DISCONTINUED | OUTPATIENT
Start: 2024-01-01 | End: 2024-01-01

## 2024-01-01 RX ORDER — REMDESIVIR 100 MG/1
200 INJECTION, POWDER, LYOPHILIZED, FOR SOLUTION INTRAVENOUS EVERY 24 HOURS
Refills: 0 | Status: COMPLETED | OUTPATIENT
Start: 2024-01-01 | End: 2024-01-01

## 2024-01-01 RX ORDER — TAMSULOSIN HCL 0.4 MG
0.8 CAPSULE ORAL AT BEDTIME
Refills: 0 | Status: DISCONTINUED | OUTPATIENT
Start: 2024-01-01 | End: 2024-01-01

## 2024-01-01 RX ORDER — SENNA 187 MG
2 TABLET ORAL AT BEDTIME
Refills: 0 | Status: DISCONTINUED | OUTPATIENT
Start: 2024-01-01 | End: 2024-01-01

## 2024-01-01 RX ORDER — ENOXAPARIN SODIUM 40MG/0.4ML
90 SYRINGE (ML) SUBCUTANEOUS EVERY 12 HOURS
Refills: 0 | Status: DISCONTINUED | OUTPATIENT
Start: 2024-01-01 | End: 2024-01-01

## 2024-01-01 RX ORDER — SODIUM CHLORIDE 9 MG/ML
1000 INJECTION, SOLUTION INTRAMUSCULAR; INTRAVENOUS; SUBCUTANEOUS ONCE
Refills: 0 | Status: COMPLETED | OUTPATIENT
Start: 2024-01-01 | End: 2024-01-01

## 2024-01-01 RX ORDER — CEFTRIAXONE SODIUM 10 G
1000 VIAL (EA) INJECTION EVERY 24 HOURS
Refills: 0 | Status: COMPLETED | OUTPATIENT
Start: 2024-01-01 | End: 2024-01-01

## 2024-01-01 RX ORDER — INFLUENZ VIR VAC TV P-SURF2003 15MCG/.5ML
0.5 SYRINGE (ML) INTRAMUSCULAR ONCE
Refills: 0 | Status: DISCONTINUED | OUTPATIENT
Start: 2024-01-01 | End: 2024-01-01

## 2024-01-01 RX ORDER — METOPROLOL TARTRATE 50 MG
25 TABLET ORAL
Refills: 0 | Status: DISCONTINUED | OUTPATIENT
Start: 2024-01-01 | End: 2024-01-01

## 2024-01-01 RX ORDER — REMDESIVIR 100 MG/1
100 INJECTION, POWDER, LYOPHILIZED, FOR SOLUTION INTRAVENOUS EVERY 24 HOURS
Refills: 0 | Status: COMPLETED | OUTPATIENT
Start: 2024-01-01 | End: 2024-01-01

## 2024-01-01 RX ORDER — METOPROLOL TARTRATE 50 MG
50 TABLET ORAL
Refills: 0 | Status: DISCONTINUED | OUTPATIENT
Start: 2024-01-01 | End: 2024-01-01

## 2024-01-01 RX ORDER — DEXMEDETOMIDINE HYDROCHLORIDE 400 UG/100ML
0.2 INJECTION, SOLUTION INTRAVENOUS
Qty: 400 | Refills: 0 | Status: DISCONTINUED | OUTPATIENT
Start: 2024-01-01 | End: 2024-01-01

## 2024-01-01 RX ORDER — PIPERACILLIN AND TAZOBACTAM .5; 4 G/20ML; G/20ML
3.38 INJECTION, POWDER, LYOPHILIZED, FOR SOLUTION INTRAVENOUS EVERY 8 HOURS
Refills: 0 | Status: DISCONTINUED | OUTPATIENT
Start: 2024-01-01 | End: 2024-01-01

## 2024-01-01 RX ORDER — REMDESIVIR 100 MG/1
INJECTION, POWDER, LYOPHILIZED, FOR SOLUTION INTRAVENOUS
Refills: 0 | Status: COMPLETED | OUTPATIENT
Start: 2024-01-01 | End: 2024-01-01

## 2024-01-01 RX ORDER — ACETAMINOPHEN 500 MG
650 TABLET ORAL EVERY 4 HOURS
Refills: 0 | Status: DISCONTINUED | OUTPATIENT
Start: 2024-01-01 | End: 2024-01-01

## 2024-01-01 RX ORDER — ACETAMINOPHEN 500 MG
1000 TABLET ORAL ONCE
Refills: 0 | Status: COMPLETED | OUTPATIENT
Start: 2024-01-01 | End: 2024-01-01

## 2024-01-01 RX ORDER — LORAZEPAM 2 MG
0.5 TABLET ORAL EVERY 4 HOURS
Refills: 0 | Status: DISCONTINUED | OUTPATIENT
Start: 2024-01-01 | End: 2024-01-01

## 2024-01-01 RX ORDER — DEXAMETHASONE 1.5 MG 1.5 MG/1
6 TABLET ORAL ONCE
Refills: 0 | Status: COMPLETED | OUTPATIENT
Start: 2024-01-01 | End: 2024-01-01

## 2024-01-01 RX ORDER — SODIUM ZIRCONIUM CYCLOSILICATE 10 G/10G
5 POWDER, FOR SUSPENSION ORAL ONCE
Refills: 0 | Status: COMPLETED | OUTPATIENT
Start: 2024-01-01 | End: 2024-01-01

## 2024-01-01 RX ORDER — PIPERACILLIN AND TAZOBACTAM .5; 4 G/20ML; G/20ML
3.38 INJECTION, POWDER, LYOPHILIZED, FOR SOLUTION INTRAVENOUS ONCE
Refills: 0 | Status: COMPLETED | OUTPATIENT
Start: 2024-01-01 | End: 2024-01-01

## 2024-01-01 RX ORDER — MORPHINE SULFATE 30 MG/1
2 TABLET, EXTENDED RELEASE ORAL
Refills: 0 | Status: DISCONTINUED | OUTPATIENT
Start: 2024-01-01 | End: 2024-01-01

## 2024-01-01 RX ORDER — CHLORHEXIDINE GLUCONATE 40 MG/ML
1 SOLUTION TOPICAL
Refills: 0 | Status: DISCONTINUED | OUTPATIENT
Start: 2024-01-01 | End: 2024-01-01

## 2024-01-01 RX ORDER — DEXAMETHASONE 1.5 MG 1.5 MG/1
6 TABLET ORAL DAILY
Refills: 0 | Status: DISCONTINUED | OUTPATIENT
Start: 2024-01-01 | End: 2024-01-01

## 2024-01-01 RX ORDER — ACETAMINOPHEN 500 MG
975 TABLET ORAL EVERY 8 HOURS
Refills: 0 | Status: DISCONTINUED | OUTPATIENT
Start: 2024-01-01 | End: 2024-01-01

## 2024-01-01 RX ORDER — B-COMPLEX WITH VITAMIN C
1 VIAL (ML) INJECTION DAILY
Refills: 0 | Status: DISCONTINUED | OUTPATIENT
Start: 2024-01-01 | End: 2024-01-01

## 2024-01-01 RX ORDER — ALBUTEROL 90 MCG
2 AEROSOL (GRAM) INHALATION EVERY 4 HOURS
Refills: 0 | Status: DISCONTINUED | OUTPATIENT
Start: 2024-01-01 | End: 2024-01-01

## 2024-01-01 RX ORDER — DOXYCYCLINE HYCLATE 100 MG/1
TABLET, FILM COATED ORAL
Refills: 0 | Status: DISCONTINUED | OUTPATIENT
Start: 2024-01-01 | End: 2024-01-01

## 2024-01-01 RX ADMIN — MORPHINE SULFATE 2 MILLIGRAM(S): 30 TABLET, EXTENDED RELEASE ORAL at 16:19

## 2024-01-01 RX ADMIN — Medication 975 MILLIGRAM(S): at 06:24

## 2024-01-01 RX ADMIN — Medication 50 MILLIGRAM(S): at 21:04

## 2024-01-01 RX ADMIN — DEXMEDETOMIDINE HYDROCHLORIDE 4.32 MICROGRAM(S)/KG/HR: 400 INJECTION, SOLUTION INTRAVENOUS at 12:19

## 2024-01-01 RX ADMIN — Medication 25 MILLIGRAM(S): at 02:25

## 2024-01-01 RX ADMIN — Medication 1000 MILLILITER(S): at 06:45

## 2024-01-01 RX ADMIN — PIPERACILLIN AND TAZOBACTAM 25 GRAM(S): .5; 4 INJECTION, POWDER, LYOPHILIZED, FOR SOLUTION INTRAVENOUS at 13:58

## 2024-01-01 RX ADMIN — DEXMEDETOMIDINE HYDROCHLORIDE 4.32 MICROGRAM(S)/KG/HR: 400 INJECTION, SOLUTION INTRAVENOUS at 09:37

## 2024-01-01 RX ADMIN — DOXYCYCLINE HYCLATE 100 MILLIGRAM(S): 100 TABLET, FILM COATED ORAL at 10:41

## 2024-01-01 RX ADMIN — PIPERACILLIN AND TAZOBACTAM 25 GRAM(S): .5; 4 INJECTION, POWDER, LYOPHILIZED, FOR SOLUTION INTRAVENOUS at 12:28

## 2024-01-01 RX ADMIN — SODIUM CHLORIDE 1000 MILLILITER(S): 9 INJECTION, SOLUTION INTRAMUSCULAR; INTRAVENOUS; SUBCUTANEOUS at 00:41

## 2024-01-01 RX ADMIN — Medication 975 MILLIGRAM(S): at 23:19

## 2024-01-01 RX ADMIN — DEXAMETHASONE 1.5 MG 6 MILLIGRAM(S): 1.5 TABLET ORAL at 06:24

## 2024-01-01 RX ADMIN — PIPERACILLIN AND TAZOBACTAM 200 GRAM(S): .5; 4 INJECTION, POWDER, LYOPHILIZED, FOR SOLUTION INTRAVENOUS at 09:47

## 2024-01-01 RX ADMIN — SODIUM ZIRCONIUM CYCLOSILICATE 5 GRAM(S): 10 POWDER, FOR SUSPENSION ORAL at 11:07

## 2024-01-01 RX ADMIN — PIPERACILLIN AND TAZOBACTAM 25 GRAM(S): .5; 4 INJECTION, POWDER, LYOPHILIZED, FOR SOLUTION INTRAVENOUS at 21:33

## 2024-01-01 RX ADMIN — REMDESIVIR 200 MILLIGRAM(S): 100 INJECTION, POWDER, LYOPHILIZED, FOR SOLUTION INTRAVENOUS at 18:13

## 2024-01-01 RX ADMIN — DEXAMETHASONE 1.5 MG 6 MILLIGRAM(S): 1.5 TABLET ORAL at 05:34

## 2024-01-01 RX ADMIN — Medication 3.24 MICROGRAM(S)/KG/MIN: at 06:36

## 2024-01-01 RX ADMIN — Medication 75 MILLILITER(S): at 22:38

## 2024-01-01 RX ADMIN — FINASTERIDE 5 MILLIGRAM(S): 5 TABLET, FILM COATED ORAL at 09:03

## 2024-01-01 RX ADMIN — Medication 0.8 MILLIGRAM(S): at 21:04

## 2024-01-01 RX ADMIN — Medication 975 MILLIGRAM(S): at 06:40

## 2024-01-01 RX ADMIN — Medication 1000 MILLIGRAM(S): at 22:26

## 2024-01-01 RX ADMIN — Medication 90 MILLIGRAM(S): at 09:12

## 2024-01-01 RX ADMIN — Medication 90 MILLIGRAM(S): at 22:38

## 2024-01-01 RX ADMIN — Medication 25 MILLIGRAM(S): at 09:04

## 2024-01-01 RX ADMIN — PIPERACILLIN AND TAZOBACTAM 25 GRAM(S): .5; 4 INJECTION, POWDER, LYOPHILIZED, FOR SOLUTION INTRAVENOUS at 12:19

## 2024-01-01 RX ADMIN — DEXMEDETOMIDINE HYDROCHLORIDE 4.32 MICROGRAM(S)/KG/HR: 400 INJECTION, SOLUTION INTRAVENOUS at 14:01

## 2024-01-01 RX ADMIN — Medication 1 TABLET(S): at 09:03

## 2024-01-01 RX ADMIN — Medication 50 MILLIGRAM(S): at 21:27

## 2024-01-01 RX ADMIN — CHLORHEXIDINE GLUCONATE 1 APPLICATION(S): 40 SOLUTION TOPICAL at 10:29

## 2024-01-01 RX ADMIN — DOXYCYCLINE HYCLATE 100 MILLIGRAM(S): 100 TABLET, FILM COATED ORAL at 21:03

## 2024-01-01 RX ADMIN — REMDESIVIR 200 MILLIGRAM(S): 100 INJECTION, POWDER, LYOPHILIZED, FOR SOLUTION INTRAVENOUS at 19:41

## 2024-01-01 RX ADMIN — PIPERACILLIN AND TAZOBACTAM 25 GRAM(S): .5; 4 INJECTION, POWDER, LYOPHILIZED, FOR SOLUTION INTRAVENOUS at 05:22

## 2024-01-01 RX ADMIN — DEXAMETHASONE 1.5 MG 6 MILLIGRAM(S): 1.5 TABLET ORAL at 06:31

## 2024-01-01 RX ADMIN — MORPHINE SULFATE 2 MILLIGRAM(S): 30 TABLET, EXTENDED RELEASE ORAL at 11:42

## 2024-01-01 RX ADMIN — DOXYCYCLINE HYCLATE 100 MILLIGRAM(S): 100 TABLET, FILM COATED ORAL at 09:22

## 2024-01-01 RX ADMIN — Medication 975 MILLIGRAM(S): at 05:34

## 2024-01-01 RX ADMIN — DEXAMETHASONE 1.5 MG 6 MILLIGRAM(S): 1.5 TABLET ORAL at 05:22

## 2024-01-01 RX ADMIN — DEXMEDETOMIDINE HYDROCHLORIDE 4.32 MICROGRAM(S)/KG/HR: 400 INJECTION, SOLUTION INTRAVENOUS at 07:32

## 2024-01-01 RX ADMIN — DOXYCYCLINE HYCLATE 100 MILLIGRAM(S): 100 TABLET, FILM COATED ORAL at 23:19

## 2024-01-01 RX ADMIN — Medication 90 MILLIGRAM(S): at 10:40

## 2024-01-01 RX ADMIN — Medication 975 MILLIGRAM(S): at 06:31

## 2024-01-01 RX ADMIN — REMDESIVIR 200 MILLIGRAM(S): 100 INJECTION, POWDER, LYOPHILIZED, FOR SOLUTION INTRAVENOUS at 18:08

## 2024-01-01 RX ADMIN — DEXMEDETOMIDINE HYDROCHLORIDE 4.32 MICROGRAM(S)/KG/HR: 400 INJECTION, SOLUTION INTRAVENOUS at 02:13

## 2024-01-01 RX ADMIN — Medication 1000 MILLIGRAM(S): at 21:26

## 2024-01-01 RX ADMIN — PIPERACILLIN AND TAZOBACTAM 25 GRAM(S): .5; 4 INJECTION, POWDER, LYOPHILIZED, FOR SOLUTION INTRAVENOUS at 05:41

## 2024-01-01 RX ADMIN — Medication 30 MILLIGRAM(S): at 06:31

## 2024-01-01 RX ADMIN — Medication 975 MILLIGRAM(S): at 06:28

## 2024-01-01 RX ADMIN — FINASTERIDE 5 MILLIGRAM(S): 5 TABLET, FILM COATED ORAL at 10:41

## 2024-01-01 RX ADMIN — REMDESIVIR 200 MILLIGRAM(S): 100 INJECTION, POWDER, LYOPHILIZED, FOR SOLUTION INTRAVENOUS at 23:11

## 2024-01-01 RX ADMIN — DEXAMETHASONE 1.5 MG 6 MILLIGRAM(S): 1.5 TABLET ORAL at 06:41

## 2024-01-01 RX ADMIN — MORPHINE SULFATE 2 MILLIGRAM(S): 30 TABLET, EXTENDED RELEASE ORAL at 10:00

## 2024-01-01 RX ADMIN — DOXYCYCLINE HYCLATE 110 MILLIGRAM(S): 100 TABLET, FILM COATED ORAL at 09:47

## 2024-01-01 RX ADMIN — Medication 3.24 MICROGRAM(S)/KG/MIN: at 02:13

## 2024-01-01 RX ADMIN — DOXYCYCLINE HYCLATE 100 MILLIGRAM(S): 100 TABLET, FILM COATED ORAL at 09:11

## 2024-01-01 RX ADMIN — Medication 3.24 MICROGRAM(S)/KG/MIN: at 09:58

## 2024-01-01 RX ADMIN — Medication 1000 MILLIGRAM(S): at 23:15

## 2024-01-01 RX ADMIN — CHLORHEXIDINE GLUCONATE 1 APPLICATION(S): 40 SOLUTION TOPICAL at 05:45

## 2024-01-01 RX ADMIN — Medication 975 MILLIGRAM(S): at 15:17

## 2024-01-01 RX ADMIN — CHLORHEXIDINE GLUCONATE 1 APPLICATION(S): 40 SOLUTION TOPICAL at 05:21

## 2024-01-01 RX ADMIN — MORPHINE SULFATE 2 MG/HR: 30 TABLET, EXTENDED RELEASE ORAL at 17:20

## 2024-01-01 RX ADMIN — Medication 75 MILLILITER(S): at 17:01

## 2024-01-01 RX ADMIN — FINASTERIDE 5 MILLIGRAM(S): 5 TABLET, FILM COATED ORAL at 09:25

## 2024-01-01 RX ADMIN — DEXAMETHASONE 1.5 MG 6 MILLIGRAM(S): 1.5 TABLET ORAL at 06:27

## 2024-01-01 RX ADMIN — DOXYCYCLINE HYCLATE 110 MILLIGRAM(S): 100 TABLET, FILM COATED ORAL at 05:21

## 2024-01-01 RX ADMIN — Medication 3 MILLIGRAM(S): at 23:15

## 2024-01-01 RX ADMIN — DEXAMETHASONE 1.5 MG 6 MILLIGRAM(S): 1.5 TABLET ORAL at 19:28

## 2024-01-01 RX ADMIN — MORPHINE SULFATE 2 MILLIGRAM(S): 30 TABLET, EXTENDED RELEASE ORAL at 15:42

## 2024-01-01 RX ADMIN — Medication 400 MILLIGRAM(S): at 21:38

## 2024-01-01 RX ADMIN — DOXYCYCLINE HYCLATE 110 MILLIGRAM(S): 100 TABLET, FILM COATED ORAL at 17:58

## 2024-01-01 RX ADMIN — DOXYCYCLINE HYCLATE 110 MILLIGRAM(S): 100 TABLET, FILM COATED ORAL at 17:10

## 2024-01-01 RX ADMIN — Medication 50 MILLILITER(S): at 13:31

## 2024-01-01 RX ADMIN — Medication 90 MILLIGRAM(S): at 06:40

## 2024-01-01 RX ADMIN — Medication 1 TABLET(S): at 10:40

## 2024-01-01 RX ADMIN — Medication 3 MILLIGRAM(S): at 21:27

## 2024-01-01 RX ADMIN — Medication 3.24 MICROGRAM(S)/KG/MIN: at 20:23

## 2024-01-01 RX ADMIN — FINASTERIDE 5 MILLIGRAM(S): 5 TABLET, FILM COATED ORAL at 09:13

## 2024-01-01 RX ADMIN — CHLORHEXIDINE GLUCONATE 1 APPLICATION(S): 40 SOLUTION TOPICAL at 09:18

## 2024-01-01 RX ADMIN — Medication 0.8 MILLIGRAM(S): at 23:15

## 2024-01-01 RX ADMIN — Medication 1000 MILLIGRAM(S): at 21:04

## 2024-01-01 RX ADMIN — DOXYCYCLINE HYCLATE 110 MILLIGRAM(S): 100 TABLET, FILM COATED ORAL at 05:41

## 2024-01-01 RX ADMIN — Medication 3.24 MICROGRAM(S)/KG/MIN: at 10:46

## 2024-01-01 RX ADMIN — Medication 1 TABLET(S): at 10:29

## 2024-01-01 RX ADMIN — Medication 90 MILLIGRAM(S): at 09:13

## 2024-01-01 RX ADMIN — MORPHINE SULFATE 2 MILLIGRAM(S): 30 TABLET, EXTENDED RELEASE ORAL at 19:09

## 2024-01-01 RX ADMIN — DOXYCYCLINE HYCLATE 100 MILLIGRAM(S): 100 TABLET, FILM COATED ORAL at 09:03

## 2024-01-01 RX ADMIN — Medication 0.8 MILLIGRAM(S): at 02:24

## 2024-01-01 RX ADMIN — Medication 3 MILLIGRAM(S): at 21:03

## 2024-01-01 RX ADMIN — Medication 975 MILLIGRAM(S): at 13:15

## 2024-01-01 RX ADMIN — CHLORHEXIDINE GLUCONATE 1 APPLICATION(S): 40 SOLUTION TOPICAL at 06:35

## 2024-01-01 RX ADMIN — REMDESIVIR 200 MILLIGRAM(S): 100 INJECTION, POWDER, LYOPHILIZED, FOR SOLUTION INTRAVENOUS at 22:45

## 2024-01-01 RX ADMIN — MORPHINE SULFATE 2 MILLIGRAM(S): 30 TABLET, EXTENDED RELEASE ORAL at 12:07

## 2024-01-01 RX ADMIN — DOXYCYCLINE HYCLATE 100 MILLIGRAM(S): 100 TABLET, FILM COATED ORAL at 21:27

## 2024-01-01 RX ADMIN — Medication 1700 MILLILITER(S): at 08:59

## 2024-01-01 RX ADMIN — MORPHINE SULFATE 2 MILLIGRAM(S): 30 TABLET, EXTENDED RELEASE ORAL at 10:30

## 2024-01-01 RX ADMIN — Medication 1000 MILLIGRAM(S): at 21:06

## 2024-01-01 RX ADMIN — Medication 90 MILLIGRAM(S): at 21:27

## 2024-01-01 RX ADMIN — Medication 0.8 MILLIGRAM(S): at 21:26

## 2024-01-01 RX ADMIN — Medication 3.24 MICROGRAM(S)/KG/MIN: at 18:15

## 2024-01-01 RX ADMIN — CHLORHEXIDINE GLUCONATE 1 APPLICATION(S): 40 SOLUTION TOPICAL at 09:13

## 2024-01-01 RX ADMIN — DEXAMETHASONE 1.5 MG 6 MILLIGRAM(S): 1.5 TABLET ORAL at 05:43

## 2024-01-01 RX ADMIN — PIPERACILLIN AND TAZOBACTAM 25 GRAM(S): .5; 4 INJECTION, POWDER, LYOPHILIZED, FOR SOLUTION INTRAVENOUS at 21:13

## 2024-01-01 RX ADMIN — MORPHINE SULFATE 2 MG/HR: 30 TABLET, EXTENDED RELEASE ORAL at 16:22

## 2024-01-01 RX ADMIN — Medication 25 MILLIGRAM(S): at 23:16

## 2024-01-01 RX ADMIN — Medication 90 MILLIGRAM(S): at 22:00

## 2024-01-01 RX ADMIN — Medication 90 MILLIGRAM(S): at 21:04

## 2024-01-01 RX ADMIN — FINASTERIDE 5 MILLIGRAM(S): 5 TABLET, FILM COATED ORAL at 10:29

## 2024-01-01 RX ADMIN — SODIUM CHLORIDE 1000 MILLILITER(S): 9 INJECTION, SOLUTION INTRAMUSCULAR; INTRAVENOUS; SUBCUTANEOUS at 18:40

## 2024-10-02 ENCOUNTER — TRANSCRIPTION ENCOUNTER (OUTPATIENT)
Age: 89
End: 2024-10-02

## 2024-10-08 PROBLEM — F03.90 UNSPECIFIED DEMENTIA, UNSPECIFIED SEVERITY, WITHOUT BEHAVIORAL DISTURBANCE, PSYCHOTIC DISTURBANCE, MOOD DISTURBANCE, AND ANXIETY: Chronic | Status: ACTIVE | Noted: 2024-01-01

## 2024-10-08 PROBLEM — H91.90 UNSPECIFIED HEARING LOSS, UNSPECIFIED EAR: Chronic | Status: ACTIVE | Noted: 2024-01-01

## 2024-10-11 ENCOUNTER — APPOINTMENT (OUTPATIENT)
Dept: UROLOGY | Facility: CLINIC | Age: 89
End: 2024-10-11

## 2024-10-11 PROCEDURE — 99204 OFFICE O/P NEW MOD 45 MIN: CPT

## 2024-10-20 PROBLEM — N28.9 RENAL LESION: Status: ACTIVE | Noted: 2024-01-01

## 2024-10-20 PROBLEM — R33.9 URINARY RETENTION: Status: ACTIVE | Noted: 2024-01-01

## 2024-10-20 PROBLEM — N40.1 BPH WITH OBSTRUCTION/LOWER URINARY TRACT SYMPTOMS: Status: ACTIVE | Noted: 2024-01-01

## 2024-10-22 NOTE — ED ADULT NURSE NOTE - NSFALLHARMRISKINTERV_ED_ALL_ED
Assistance OOB with selected safe patient handling equipment if applicable/Assistance with ambulation/Communicate risk of Fall with Harm to all staff, patient, and family/Monitor gait and stability/Provide visual cue: red socks, yellow wristband, yellow gown, etc/Reinforce activity limits and safety measures with patient and family/Bed in lowest position, wheels locked, appropriate side rails in place/Call bell, personal items and telephone in reach/Instruct patient to call for assistance before getting out of bed/chair/stretcher/Non-slip footwear applied when patient is off stretcher/[place] to call system/Physically safe environment - no spills, clutter or unnecessary equipment/Purposeful Proactive Rounding/Room/bathroom lighting operational, light cord in reach

## 2024-10-22 NOTE — ED ADULT NURSE REASSESSMENT NOTE - NS ED NURSE REASSESS COMMENT FT1
MD Nicole made aware pt 02 saturation decreased to 88% while on nasal cannula. pt appeared to be breathing through mouth so venturi mask applied. pt o2 saturation increased at 50% Fi02 to 95%. head of bed elevated. pt repositioned.

## 2024-10-22 NOTE — PATIENT PROFILE ADULT - FALL HARM RISK - HARM RISK INTERVENTIONS

## 2024-10-22 NOTE — PATIENT PROFILE ADULT - NSPROGENBLOODRESTRICT_GEN_A_NUR
Subjective   Patient ID: Angela is a 32 year old female who presents today for prenatal visit.  She is of 39w4d gestation.  OB History    Para Term  AB Living   1 0 0 0 0 0   SAB TAB Ectopic Molar Multiple Live Births   0 0 0 0 0 0   Obstetric Comments           Objective     positive fetal movement, No bleeding, No rupture of membranes, No uterine contractions    ASSESSMENT:  A/P 33yo  pregnancy at 39w4d weeks gestation.  Problem List Items Addressed This Visit        Gravid and     Pregnancy    Uterine size date discrepancy       Infectious Diseases    Positive GBS test      Other Visit Diagnoses     Post term pregnancy, antepartum condition or complication    -  Primary    Relevant Orders    US OB BIOPHYSICAL PROFILE WITH NON STRESS TEST          PLAN:  1) FHTs +  2) PNL utd; gbbs + needs pcn in labor  3) Genetics: NIPT/AFP neg  4) s<d: last growth  26%tile  5) labor precautions/kick cts reviewed- apt due to postdates given to start Monday. Pt is unsure about iol. All questions answered  
none

## 2024-10-22 NOTE — ED ADULT NURSE REASSESSMENT NOTE - NS ED NURSE REASSESS COMMENT FT1
MD Nicole made aware of rectal temperature and HR. MD made aware that Miller to leg bag is currently present but per daughter was supposed to be removed on Friday. Will remove Miller and trial normal urination. MD Nicole made aware of elevated d-dimer. Orders pending.

## 2024-10-22 NOTE — ED ADULT TRIAGE NOTE - CHIEF COMPLAINT QUOTE
pt bibems from home with aid c/o "failure to thrive" as per ems. as per ems pts family states pt has had a poor appetite x1 day. pt tested +covid yesterday. pmh dementia.

## 2024-10-22 NOTE — ED PROVIDER NOTE - CLINICAL SUMMARY MEDICAL DECISION MAKING FREE TEXT BOX
89-year-old male history of dementia hypertension presents to the emergency department for weakness and fatigue.  Patient was recently at [facility] when he contracted COVID since yesterday's been having fever shortness of breath weakness.  History obtained from family at bedside.  Here patient is in rapid A-fib to 110 which appears to be new onset oxygen is 89% on room air improved to 96% with 2 L nasal cannula.  Patient has no other complaints at this time.  Exam with rapid A-fib otherwise nonfocal will do septic workup symptoms likely from COVID patient with hypoxia will require admission workup for PE as well. 89-year-old male history of dementia hypertension presents to the emergency department for weakness and fatigue.  Patient was recently at [facility] when he contracted COVID since yesterday's been having fever shortness of breath weakness.  History obtained from family at bedside.  Here patient is in rapid A-fib to 110 which appears to be new onset oxygen is 89% on room air improved to 96% with 2 L nasal cannula.  Patient has no other complaints at this time.  Exam with rapid A-fib otherwise nonfocal will do septic workup symptoms likely from COVID patient with hypoxia will require admission workup for PE as well.  639All labs imaging reviewed by myself.  X-ray independently interpreted by myself without obvious pneumonia.  Patient was hypoxic to 89% on room air improved with 2 L nasal cannula.  Patient's heart rate came down to the 90s.  Case discussed with Dr. Nicole who accepts.

## 2024-10-22 NOTE — ED PROVIDER NOTE - PHYSICAL EXAMINATION
Constitutional: NAD    Eyes: PERRLA EOMI  Head: Normocephalic atraumatic  Mouth: MMM  Cardiac: rapid aftrial fib no LE swelling clear b/l   Resp: unlabored breathing clear b/l   GI: Abd s/nt/nd  Neuro: grossly normal and intact  Skin: No visible rashes

## 2024-10-22 NOTE — ED PROVIDER NOTE - CARE PLAN
Principal Discharge DX:	Rapid atrial fibrillation  Secondary Diagnosis:	Acute respiratory failure with hypoxia  Secondary Diagnosis:	2019 novel coronavirus disease (COVID-19)   1

## 2024-10-22 NOTE — ED ADULT NURSE REASSESSMENT NOTE - NS ED NURSE REASSESS COMMENT FT1
Received report from ACACIA Neville. pt awake and alert, AO x 4. Respirations even and unlabored. No complaints at this time. pt on cardiac monitor and continuos pulse ox. Call bell in reach. Side rails up. Safety and comfort maintained.

## 2024-10-22 NOTE — PATIENT PROFILE ADULT - HEALTH LITERACY
[facility] Progress Note      Subjective:  Feeling better    I/O's    Intake/Output Summary (Last 24 hours) at 9/10/2022 1158  Last data filed at 9/9/2022 2000  Gross per 24 hour   Intake 0 ml   Output --   Net 0 ml       Review of systems  Eye Problem(s):negative  ENT Problem(s):negative  Cardiovascular problem(s):negative  Respiratory problem(s):negative  Gastro-intestinal problem(s):negative GI  Genito-urinary problem(s):negative  Musculoskeletal problem(s):muscular weakness  Integumentary problem(s):negative  Neurological problem(s):negative  Psychiatric problem(s):negative  Endocrine problem(s):negative  Hematologic and/or Lymphatic problem(s):negative    Last Recorded Vitals  Blood pressure (!) 147/88, pulse 64, temperature 97.3 °F (36.3 °C), temperature source Axillary, resp. rate 18, height 5' 8.4\" (1.737 m), weight 85 kg (187 lb 6.3 oz), SpO2 100 %.    General: awake, alert  CV: RRR, no m/r/g  Lungs: CTA  Abd: soft, NT, ND, no rebound, no guarding  Ext: no LE edema  Neuro: grossly intact    Labs     Recent Results (from the past 24 hour(s))   Creatine Kinase    Collection Time: 09/09/22  3:21 PM   Result Value Ref Range    CK 4,311 (H) 26 - 192 Units/L   Comprehensive Metabolic Panel    Collection Time: 09/10/22  6:03 AM   Result Value Ref Range    Fasting Status      Sodium 141 135 - 145 mmol/L    Potassium 3.5 3.4 - 5.1 mmol/L    Chloride 106 97 - 110 mmol/L    Carbon Dioxide 23 21 - 32 mmol/L    Anion Gap 16 7 - 19 mmol/L    Glucose 91 70 - 99 mg/dL    BUN 29 (H) 6 - 20 mg/dL    Creatinine 2.93 (H) 0.51 - 0.95 mg/dL    Glomerular Filtration Rate 19 (L) >=60    BUN/ Creatinine Ratio 10 7 - 25    Calcium 7.9 (L) 8.4 - 10.2 mg/dL    Bilirubin, Total 0.6 0.2 - 1.0 mg/dL    GOT/ (H) <=37 Units/L    GPT/ (H) <64 Units/L    Alkaline Phosphatase 52 45 - 117 Units/L    Albumin 2.5 (L) 3.6 - 5.1 g/dL    Protein, Total 5.6 (L) 6.4 - 8.2 g/dL    Globulin 3.1 2.0 - 4.0 g/dL    A/G  Ratio 0.8 (L) 1.0 - 2.4   Prothrombin Time    Collection Time: 09/10/22  6:03 AM   Result Value Ref Range    Prothrombin Time 13.6 (H) 9.7 - 11.8 sec    INR 1.3     Creatine Kinase    Collection Time: 09/10/22  6:03 AM   Result Value Ref Range    CK 2,540 (H) 26 - 192 Units/L   CBC with Automated Differential (performable only)    Collection Time: 09/10/22  6:03 AM   Result Value Ref Range    WBC 6.3 4.2 - 11.0 K/mcL    RBC 3.71 (L) 4.00 - 5.20 mil/mcL    HGB 11.9 (L) 12.0 - 15.5 g/dL    HCT 33.6 (L) 36.0 - 46.5 %    MCV 90.6 78.0 - 100.0 fl    MCH 32.1 26.0 - 34.0 pg    MCHC 35.4 32.0 - 36.5 g/dL    RDW-CV 13.4 11.0 - 15.0 %    RDW-SD 44.6 39.0 - 50.0 fL     140 - 450 K/mcL    NRBC 0 <=0 /100 WBC    Neutrophil, Percent 66 %    Lymphocytes, Percent 22 %    Mono, Percent 8 %    Eosinophils, Percent 2 %    Basophils, Percent 1 %    Immature Granulocytes 1 %    Absolute Neutrophils 4.3 1.8 - 7.7 K/mcL    Absolute Lymphocytes 1.4 1.0 - 4.8 K/mcL    Absolute Monocytes 0.5 0.3 - 0.9 K/mcL    Absolute Eosinophils  0.1 0.0 - 0.5 K/mcL    Absolute Basophils 0.0 0.0 - 0.3 K/mcL    Absolute Immmature Granulocytes 0.0 0.0 - 0.2 K/mcL          A/P  Active Hospital Problems    Diagnosis    • Acute right-sided weakness       45yo F with h/o ETOH abuse, cocaine abuse who was admitted with R sided weakness, rhabdo, GLENNA. Overall improving. For stress test today, MRI pending     Acute nontraumatic rhabdo  - no trauma or strenuous activity  - ESR, CRP, ALEJANDRA ordered  - cont IVF     R sided weakness  - 2/2 rhabdo?  - r/o CVA, ?cocaine related  - MRI ordered  - neuro consulted  - PT/OT to eval     GLENNA  - 2/2 contrast, rhabdo  - cont IVF  - renal consutled     Transaminitis - improving  - CTA a/p with liver lesion -- MRI ordered  - GI consulted  - hep panel negative  - will monitor     SIRs, POA  - pt tachycardic, with leukocytosis  - likely reactive  - will monitor     Elevated troponin, Chest pain  - suspect this is from GLENNA,  rhabdo  - cardiology consulted  - trend troponin  - stress pending     ETOH abuse  Cocaine abuse  - discussed cessation  - CIWA     Time spent in evaluating and coordinating care of patient- 30 min         no

## 2024-10-22 NOTE — ED PROVIDER NOTE - OBJECTIVE STATEMENT
89-year-old male history of dementia hypertension presents to the emergency department for weakness and fatigue.  Patient was recently at [facility] when he contracted COVID since yesterday's been having fever shortness of breath weakness.  History obtained from family at bedside.  Here patient is in rapid A-fib to 110 which appears to be new onset oxygen is 89% on room air improved to 96% with 2 L nasal cannula.  Patient has no other complaints at this time.  Exam with rapid A-fib otherwise nonfocal will do septic workup symptoms likely from COVID patient with hypoxia will require admission workup for PE as well.

## 2024-10-23 NOTE — CONSULT NOTE ADULT - SUBJECTIVE AND OBJECTIVE BOX
Patient is a 89y old  Male who presents with a chief complaint of AHRF & Afib w/ RVR (23 Oct 2024 12:11)    HPI:  Patient is a 89 year old man with a PMH of  AFIB and Alzheimer dementia, he know known to our service s/p fall on 9/29/23 where he suffered a SDH and required a left hip ORIF hemiarthroplasty, he was discharged from the hosptial to rehab on 10/2, he presented to the ED yesterday with fever, SOB, and weakness, he was diagnosed with COVID and was admitted to the medicine service.   Neurosurgery consulted with regards to restarting DOAC as the patient is high risk of stroke give a-fib with RVR and now +covid infection.   CT head reviewed -- stable appearing SDH, no significant change in size.     PAST MEDICAL & SURGICAL HISTORY:  AFIB dx 09-  Alzheimer dementia  BPH just started meds 10-20  Fall w L hip fx and LSDH  ICH intracranial bleeding (6years ago)  Subdural hematoma 09- L frontoparietal  Urinary retention gallegos placed 09-    PAST SURGICAL HX  ORIF Hemiarthroplasty L hip Dr. Pollack 09-    FAMILY HISTORY: unknown      Social Hx:  Nonsmoker, no drug or alcohol use    MEDICATIONS  (STANDING):  acetaminophen     Tablet .. 975 milliGRAM(s) Oral every 8 hours  chlorhexidine 4% Liquid 1 Application(s) Topical <User Schedule>  dexAMETHasone  Injectable 6 milliGRAM(s) IV Push daily  enoxaparin Injectable 30 milliGRAM(s) SubCutaneous every 24 hours  finasteride 5 milliGRAM(s) Oral daily  influenza  Vaccine (HIGH DOSE) 0.5 milliLiter(s) IntraMuscular once  melatonin 3 milliGRAM(s) Oral at bedtime  metoprolol tartrate 25 milliGRAM(s) Oral two times a day  multivitamin 1 Tablet(s) Oral daily  remdesivir  IVPB 100 milliGRAM(s) IV Intermittent every 24 hours  tamsulosin 0.8 milliGRAM(s) Oral at bedtime     Allergies  No Known Allergies    ROS: Pertinent positives in HPI, all other ROS were reviewed and are negative.      Vital Signs Last 24 Hrs  T(C): 37.1 (23 Oct 2024 08:40), Max: 39.1 (22 Oct 2024 21:11)  T(F): 98.7 (23 Oct 2024 08:40), Max: 102.3 (22 Oct 2024 21:11)  HR: 74 (23 Oct 2024 08:40) (74 - 102)  BP: 98/67 (23 Oct 2024 08:40) (98/67 - 126/91)  BP(mean): 92 (22 Oct 2024 23:55) (79 - 92)  RR: 19 (23 Oct 2024 08:40) (17 - 20)  SpO2: 98% (23 Oct 2024 08:40) (93% - 98%)    Parameters below as of 23 Oct 2024 08:40  Patient On (Oxygen Delivery Method): room air    PHYSICAL EXAM:   Constitutional: Awake / alert  HEENT: PERRL, EOMI, [place]   Neck: Supple  Respiratory: Respirations non-labored  Cardiovascular: irregular rhythm  Gastrointestinal: Soft, NT/ND  Extremities: moving all extremities antigravity     Neurological Exam:  HF: awake and alert, States name. unsure of place and time. Pt follows commands, speech clear  CN: PERRL, EOMI, no NLFD, tongue midline  Motor: GOEL adequately to command, limited LLE due to hip fxr  Sens: Intact to light touch  Reflexes: Symmetric and normal, no clonus, no Grace's   Coord:  Limited   Gait/Balance: not tested   Psych-flat affect     Labs:                        13.8   10.26 )-----------( 226      ( 22 Oct 2024 17:24 )             43.1     10-23    x   |  x   |  x   ----------------------------<  x   x    |  x   |  1.25    Ca    9.0      22 Oct 2024 17:24  Phos  3.7     10-23  Mg     2.6     10-23    TPro  6.8  /  Alb  2.2[L]  /  TBili  1.2  /  DBili  0.5[H]  /  AST  97[H]  /  ALT  72  /  AlkPhos  116  10-23    PT/INR - ( 23 Oct 2024 07:17 )   PT: 15.4 sec;   INR: 1.31 ratio      PTT - ( 22 Oct 2024 18:19 )  PTT:33.0 sec    RADIOLOGY:  < from: CT Head No Cont (10.23.24 @ 10:10) >  Extra axial high attenuation again seen involving the left frontal   parietal region. This finding measures approximately 1.0 cm in widest   diameter and previously measured approximately 1.1 cm in widest diameter.   This compatible with acute subdural hematoma. There is localized mass   effect seen consisting of sulcal effacement. No significant shift or   herniation seen.    Evaluation of the osseous with the appropriate window appears normal    Left maxillary bilateral ethmoid and left frontal sinus sinus callosal   thickening seen    Both mastoid and middle ear regions appear clear.     Patient is a 89y old  Male who presents with a chief complaint of AHRF & Afib w/ RVR (23 Oct 2024 12:11)    HPI:  Patient is a 89 year old man with a PMH of  AFIB and Alzheimer dementia, he know known to our service s/p fall on 9/29/23 where he suffered a SDH and required a left hip ORIF hemiarthroplasty, he was discharged from the hospital to rehab on 10/2, he presented to the ED yesterday with fever, SOB, and weakness, he was diagnosed with COVID and was admitted to the medicine service.   Neurosurgery consulted with regards to restarting DOAC as the patient is high risk of stroke give a-fib with RVR and now +covid infection.   CT head reviewed -- stable appearing SDH, no significant change in size.   Pt seen and examined on 3E, awake, alert, oriented to self and hospital, in good spirits, not septic appearing.      PAST MEDICAL & SURGICAL HISTORY:  AFIB dx 09-  Alzheimer dementia  BPH just started meds 10-20  Fall w L hip fx and LSDH  ICH intracranial bleeding (6years ago)  Subdural hematoma 09- L frontoparietal  Urinary retention gallegos placed 09-    PAST SURGICAL HX  ORIF Hemiarthroplasty L hip Dr. Pollack 09-    FAMILY HISTORY: unknown      Social Hx:  Nonsmoker, no drug or alcohol use    MEDICATIONS  (STANDING):  acetaminophen     Tablet .. 975 milliGRAM(s) Oral every 8 hours  chlorhexidine 4% Liquid 1 Application(s) Topical <User Schedule>  dexAMETHasone  Injectable 6 milliGRAM(s) IV Push daily  enoxaparin Injectable 30 milliGRAM(s) SubCutaneous every 24 hours  finasteride 5 milliGRAM(s) Oral daily  influenza  Vaccine (HIGH DOSE) 0.5 milliLiter(s) IntraMuscular once  melatonin 3 milliGRAM(s) Oral at bedtime  metoprolol tartrate 25 milliGRAM(s) Oral two times a day  multivitamin 1 Tablet(s) Oral daily  remdesivir  IVPB 100 milliGRAM(s) IV Intermittent every 24 hours  tamsulosin 0.8 milliGRAM(s) Oral at bedtime     Allergies  No Known Allergies    ROS: Pertinent positives in HPI, all other ROS were reviewed and are negative.      Vital Signs Last 24 Hrs  T(C): 37.1 (23 Oct 2024 08:40), Max: 39.1 (22 Oct 2024 21:11)  T(F): 98.7 (23 Oct 2024 08:40), Max: 102.3 (22 Oct 2024 21:11)  HR: 74 (23 Oct 2024 08:40) (74 - 102)  BP: 98/67 (23 Oct 2024 08:40) (98/67 - 126/91)  BP(mean): 92 (22 Oct 2024 23:55) (79 - 92)  RR: 19 (23 Oct 2024 08:40) (17 - 20)  SpO2: 98% (23 Oct 2024 08:40) (93% - 98%)    Parameters below as of 23 Oct 2024 08:40  Patient On (Oxygen Delivery Method): room air    PHYSICAL EXAM:   Constitutional: Awake / alert  HEENT: PERRL, EOMI, [place]   Neck: Supple  Respiratory: Respirations non-labored  Cardiovascular: irregular rhythm  Gastrointestinal: Soft, NT/ND  Extremities: moving all extremities antigravity   Psych: awake and alert, participates in exam, very pleasant     Neurological Exam:  HF: awake and alert, States name. unsure of place and time. Pt follows commands, speech clear  CN: PERRL, EOMI, no NLFD, tongue midline  Motor: moving all extremities antigravity with good strength   Sens: Intact to light touch  Reflexes: Symmetric and normal, no clonus, no Grace's   Coord:  Limited   Gait/Balance: not tested     Labs:                        13.8   10.26 )-----------( 226      ( 22 Oct 2024 17:24 )             43.1     10-23    x   |  x   |  x   ----------------------------<  x   x    |  x   |  1.25    Ca    9.0      22 Oct 2024 17:24  Phos  3.7     10-23  Mg     2.6     10-23    TPro  6.8  /  Alb  2.2[L]  /  TBili  1.2  /  DBili  0.5[H]  /  AST  97[H]  /  ALT  72  /  AlkPhos  116  10-23    PT/INR - ( 23 Oct 2024 07:17 )   PT: 15.4 sec;   INR: 1.31 ratio      PTT - ( 22 Oct 2024 18:19 )  PTT:33.0 sec    RADIOLOGY:  < from: CT Head No Cont (10.23.24 @ 10:10) >  Extra axial high attenuation again seen involving the left frontal   parietal region. This finding measures approximately 1.0 cm in widest   diameter and previously measured approximately 1.1 cm in widest diameter.   This compatible with acute subdural hematoma. There is localized mass   effect seen consisting of sulcal effacement. No significant shift or   herniation seen.    Evaluation of the osseous with the appropriate window appears normal    Left maxillary bilateral ethmoid and left frontal sinus sinus callosal   thickening seen    Both mastoid and middle ear regions appear clear.

## 2024-10-23 NOTE — PHYSICAL THERAPY INITIAL EVALUATION ADULT - GENERAL OBSERVATIONS, REHAB EVAL
Pt found supine in bed on 3E in NAD, agreeable to participate in PT Evaluation. Pt is able to perform bed mobility and sit<>stand transfers with Miguel to RW. Pt is able to ambulate 25 feet with RW and CGA. Pt returned to bed with call bell and phone in reach, bed alarm on, ACACIA Fields is aware.

## 2024-10-23 NOTE — H&P ADULT - HISTORY OF PRESENT ILLNESS
89 year old male w Alzheimer dementia w SDH and L hip fx ORIF 09-, urinary retention w gallegos  BIBEMS for weakness and fatigue      Recent adm 09-29 to 10- adm to ICU s/p fall from standing on to carpeted floor sustaining L hip fx and L SDH.    DC to [facility] for rehab on lovenox 40 mg qd x 4 weeks  [facility] where [facility] COVID   since yesterday:  +fever +SOB +weakness.  History obtained from family at bedside.        In ED /91   HR 94   RR 17   T 97.5   93% sat RA  AFIB w , O2 89% RA,  96% with 2 L nc  CXR neg , remdesivir, decadron      Being admitted to our service for AHRF d/t  COVID-19, AFIB w RVR          PAST MEDICAL HX  AFIB dx 09-  Alzheimer dementia  BPH just started meds 10-20  Fall w L hip fx and LSDH  ICH intracranial bleeding (6years ago)  Subdural hematoma 09- L frontoparietal  Urinary retention gallegos placed 09-        PAST SURGICAL HX  ORIF Hemiarthroplasty L hip Dr. Pollack 09-      SOCIAL HX  lives w family       89 year old male w recent dx AFIB, Alzheimer dementia,  recent L SDH and L hip fx ORIF 09-, BPH urinary retention w gallegos  BIBEMS for weakness and fatigue      Recent adm 09-29 to 10- adm to ICU s/p fall from standing on to carpeted floor sustaining L hip fx and L SDH.    DC to [facility] for rehab on lovenox 40 mg qd x 4 weeks  Recently at [facility] rehab where he contracted COVID   since yesterday:  +fever +SOB +weakness.  History obtained from family at bedside when they were in ED     At time of my interview and exam, pt was alone  I was contacted by ED nurse that d-dimer was 1900; I ordered CTA      In ED /91   HR 94   RR 17   T 97.5   93% sat RA  AFIB w , O2 89% RA,  96% with 2 L nc   NS 1000 cc  CXR neg , remdesivir, decadron      Being admitted to our service for AHRF d/t  COVID-19, AFIB w RVR      PAST MEDICAL HX  AFIB dx 09-  Alzheimer dementia  BPH just started meds 10-20  Fall w L hip fx and LSDH  ICH intracranial bleeding (6years ago)  Subdural hematoma 09- L frontoparietal  Urinary retention gallegos placed 09-        PAST SURGICAL HX  ORIF Hemiarthroplasty L hip Dr. Pollack 09-      SOCIAL HX  lives w family

## 2024-10-23 NOTE — PROGRESS NOTE ADULT - ATTENDING COMMENTS
#AHRF, likely secondary to COVID ]  -Remedesivir/Decadron   -Rocephin/Doxy for superimposed PNA  -supplemental oxgyen prn     #pAfib   -Lovenox 1mg/kg (discussed with NSx and family). OK per NSx to start AC. Discussed with family. Risk and benefits discussed in great detail   -BB  -Cardiology eval

## 2024-10-23 NOTE — PHYSICAL THERAPY INITIAL EVALUATION ADULT - PERTINENT HX OF CURRENT PROBLEM, REHAB EVAL
89 year old male w recent dx AFIB, Alzheimer dementia,  recent L SDH and L hip fx ORIF 09-, BPH urinary retention w gallegos  BIBEMS for weakness and fatigue  Recent adm 09-29 to 10- adm to ICU s/p fall from standing on to carpeted floor sustaining L hip fx and L SDH.    DC to [facility] for rehab on lovenox 40 mg qd x 4 weeks  Recently at [facility] rehab where he contracted COVID   since yesterday:  +fever +SOB +weakness.  History obtained from family at bedside when they were in ED

## 2024-10-23 NOTE — H&P ADULT - NSHPPHYSICALEXAM_GEN_ALL_CORE
Vital Signs Last 24 Hrs  T(C): 36.4 (22 Oct 2024 23:55), Max: 39.1 (22 Oct 2024 21:11)  T(F): 97.6 (22 Oct 2024 23:55), Max: 102.3 (22 Oct 2024 21:11)  HR: 90 (22 Oct 2024 23:55) (90 - 102)  BP: 115/66 (22 Oct 2024 23:55) (108/66 - 126/91)  BP(mean): 92 (22 Oct 2024 23:55) (79 - 92)  RR: 20 (22 Oct 2024 22:53) (17 - 20)  SpO2: 94% (22 Oct 2024 23:55) (93% - 95%)    Parameters below as of 22 Oct 2024 23:55  Patient On (Oxygen Delivery Method): mask, Venturi

## 2024-10-23 NOTE — PHYSICAL THERAPY INITIAL EVALUATION ADULT - ADDITIONAL COMMENTS
Hx of Dementia   Most recently from [facility] Hx of Dementia. Patient is unable to provide social history  As per Case Management: Patient has 24/7 Aides  No stairs at home Hx of Dementia. Patient is unable to provide social history  As per Case Management: Patient has 24/7 Aide support   2 MADI   DME: RW, WC, Commode, Bath seat

## 2024-10-23 NOTE — H&P ADULT - ASSESSMENT
89 year old male w Alzheimer dementia w SDH and L hip fx ORIF 09-, urinary retention w gallegos  BIBEMS for weakness and fatigue        #AHRF  #COVID-19  #Elevated d-dimer  1. place on OBS  2. remdesivir  3. decadron  4, supplemental O2  5. isolation      #AFIB  identified during recent admission   in ED then w fluids decreased to 90s until temp spike  1. not on full AC due to SDH  2. BB        #SDH          #Hip fx L s/p ORIF 09-  1. fall precuations 89 year old male w Alzheimer dementia w SDH and L hip fx ORIF 09-, urinary retention w gallegos  BIBEMS for weakness and fatigue        #AHRF  #COVID-19 PNA  #Elevated d-dimer  no PE + GGO pulm edema or viral and in this setting viral  1. place on OBS  2. remdesivir  3. decadron  4, supplemental O2  5. isolation      #AFIB  identified during recent admission 09-29 to 10-   in ED then w fluids decreased to 90s until temp spike  1. not on full AC due to SDH  2. BB  3. had recent ECHO so not reordered: nl LVEF  4. CARD consult  d/t COVID should pt be contained on lovenox sq qd or should he be on DOAC?        #Traumatic SDH 09-  nonfocal exam, no hx LOC  last CT scan was 09-  1. CTH check SDH  2. NSg consult to see if pt should remain on lovenox s/p hip and now w COVID OR can pt be placed on DOAC given AFIB?        #Hip fx L s/p ORIF 09-  1. fall precautions  2. PT consult to prevent deconditioning  3. vicodin NA interch to percocet q 6 hrs prn  4. naloxone 0.4 mg IV prn  5. senna  6. miralax        #BPH w urinary retention  significantly enlarged prostate found last adm and pt had gallegos; saw  10-20  family requested removal of gallegos in ED for TOV  1. DC gallegos to give TOV  2. bladder scan q 8 w straight cath for volume > 350 cc  3. finasteride  4. tamsulosin      #VTE  lovenox qd pending consult recommendation      #GOC  FULL CODE          #80 minutes

## 2024-10-23 NOTE — H&P ADULT - NSHPLABSRESULTS_GEN_ALL_CORE
CTA    INTERPRETATION:  CLINICAL INFORMATION: Heart failure, Covid, elevated   d-dimer.    COMPARISON: 9/29/2024.    CONTRAST/COMPLICATIONS:  IV Contrast: Omnipaque 350  70 cc administered   0 cc discarded  Oral Contrast: NONE  Complications: None reported at time of study completion    Preliminary: No pulmonary embolism. Emphysema. Bilateral groundglass   opacities and peribronchial thickening which may represent pulmonary   edema and/or atypical/viral pneumonia. Trace bilateral pleural effusions.   Calcified pleural plaques. Cardiomegaly.

## 2024-10-23 NOTE — CONSULT NOTE ADULT - ASSESSMENT
Patient is a 89 year old man with a PMH of  AFIB and Alzheimer dementia, he know known to our service s/p fall on 9/29/23 where he suffered a SDH and required a left hip ORIF hemiarthroplasty, he was discharged from the hosptial to rehab on 10/2, he presented to the ED yesterday with fever, SOB, and weakness, he was diagnosed with COVID and was admitted to the medicine service.   Neurosurgery consulted with regards to restarting DOAC as the patient is high risk of stroke give a-fib with RVR and now +covid infection.     # COVID infection   # A-fib with RVR   # alzheimer dementia     PLAN-  SDH essentially unchanged   Given the pt is at high risk of stroke, the benefit of restarting a DOAC would outweigh the risk of rebleeding at this time.   Repeat CT head in 2 weeks sooner if there is a change in neuro exam.     Discussed with Dr. Chan who has reviewed imaging and agrees with plan.

## 2024-10-24 NOTE — CONSULT NOTE ADULT - NS ATTEND AMEND GEN_ALL_CORE FT
I discussed case with DAVID Villanueva and hospitalist, Dr. Landa. I reviewed ECGs and telemetry; management of AF as described above.

## 2024-10-24 NOTE — CONSULT NOTE ADULT - PROBLEM SELECTOR RECOMMENDATION 9
pt. with with new Afib, at present rate controlled on BB, new Afib likely due to COVID PNA  Echo from 9/29/24 shows preserved LVEF 60-65%, severely dilated LA, mild to moderate MR  Neurosurgery cleared pt. for DOAC  Recommendation: CHADsVASC=2, minitor on tele for rate control, cont. BB, cont. lovenox - transition to DOAC  as pt is at high risk of stroke, the benefit of restarting a DOAC would outweigh the risk of Fall/rebleeding at this time, Neurosurgery consult appreciated pt. with with new Afib, at present rate controlled on BB, new Afib likely due to COVID PNA  Echo from 9/29/24 shows preserved LVEF 60-65%, severely dilated LA, mild to moderate MR  Neurosurgery cleared pt. for DOAC  Recommendation: CHADsVASC=2, monitor on tele for rate control, cont. BB, cont. lovenox - transition to DOAC  as pt is at high risk of stroke, the benefit of restarting a DOAC would outweigh the risk of Fall/rebleeding at this time, Neurosurgery consult appreciated

## 2024-10-24 NOTE — GOALS OF CARE CONVERSATION - ADVANCED CARE PLANNING - CONVERSATION DETAILS
HPI: As per medical record,  89 year old male w recent dx AFIB, Alzheimer dementia,  recent L SDH and L hip fx ORIF 09-, BPH urinary retention w gallegos  BIBEMS for weakness and fatigue  Recent adm 09-29 to 10- adm to ICU s/p fall from standing on to carpeted floor sustaining L hip fx and L SDH.    DC to [facility] for rehab on lovenox 40 mg qd x 4 weeks  Recently at [facility] rehab where he contracted COVID   since yesterday:  +fever +SOB +weakness.  History obtained from family at bedside when they were in ED   At time of my interview and exam, pt was alone  I was contacted by ED nurse that d-dimer was 1900; I ordered CTA.  (23 Oct 2024 00:09)      PERTINENT PMH REVIEWED:  [ x ] YES [ ] NO           Primary Contact: ernestina Serna ([phone])    HCP [  ] Surrogate [  x ] Guardian [   ]   - Pt has son & dtr who are equal surrogates until HCP provided.     Mental Status: [  ] Alert  [  ] Oriented [ x ] Confused [  ] Lethargic   Concerns of Depression [  ] unable to assess  Anxiety [   ] unable to assess  Baseline ADLs (prior to admission):  Independent [ ] moderately [ ] fully   Dependent   [ x ] moderately [ ]fully    Family Meeting attendees: Pt;s daughter Daphne participated in discussion    Anticipated Grief: Patient[  ] Family [  ]    Caregiver [place] Assessed: Yes [ x ] No [  ]    Pentecostal: None identified.    Spiritual Concerns: None reported.  available PRN    Goals of Care: Comfort [  ] Rehabilitation [  ] Curative [ x ] Life Prolonging [ x ]    Previous Services: [facility] Aides, [employer] Benefits    ADVANCE DIRECTIVES:  [ ] YES [ x ] NO    - Full code   - Family to try and located copy of Health Care Proxy     Anticipated D/C Plan: home with home care & aides                     Summary: Palliative SW spoke with pt's daughter Daphne via telephone to introduce team and offer support. Palliative roles explained. ChanoRikki explains that pt is  and resides home with 24hr aides. Pt has 2 adult children - Daphne, and son in [place]. She shares that pt has had aides in the home since his spouse passed away 6 years ago but that he has been mostly independent with things like bathing and eating. She explains that she has noticed a decline since his fall in August. Pt had a 2 week stay at [facility] but daughter expressed some dissatisfaction with pt's care & progress. She explains that the goal is for pt to return home to his familiar environment following hospital stay,    SW inquired about Advanced Directives and wishes. She explains that pt had completed a Health Care Proxy 10 years ago and at the time, he was deferring to his wife on decisions regarding things like resuscitation. At that time, pt's spouse wanted them to have everything done including CPR and intubation. ChanosaminaTiarra explains that this is a very difficult decision for her to make and would like pt to remain Full Code at this time. HAYES explained what CPR and intubation may look like and if successful, a decision would need to be made as to whether pt should remain on a vent long-term. HAYES encouraged dtr to discuss further with her brother. Feelings validated.    Emotional support provided. Pt is Full Code. Goal is for pt to return home. Our team will continue to follow.

## 2024-10-24 NOTE — CONSULT NOTE ADULT - CONVERSATION DETAILS
HPI: As per medical record,   daughter Daphne via telephone to introduce team and offer support. Palliative roles explained. Daphne explains that pt is  and resides home with 24hr aides. Pt has 2 adult children - Daphne, and son in [place]. She shares that pt has had aides in the home since his spouse passed away 6 years ago but that he has been mostly independent with things like bathing and eating. She explains that she has noticed a decline since his fall in August. Pt had a 2 week stay at [facility] but daughter expressed some dissatisfaction with pt's care & progress. She explains that the goal is for pt to return home to his familiar environment following hospital stay,       She explains that pt had completed a Health Care Proxy 10 years ago and at the time, he was deferring to his wife on decisions regarding things like resuscitation. At that time, pt's spouse wanted them to have everything done including CPR and intubation. Daphne explains that this is a very difficult decision for her to make and would like pt to remain Full Code at this time.

## 2024-10-24 NOTE — CONSULT NOTE ADULT - SUBJECTIVE AND OBJECTIVE BOX
CHIEF COMPLAINT: Patient is a 89y old  Male who presents with a chief complaint of AHRF & Afib w/ RVR (23 Oct 2024 12:11)      HPI:  89 year old male w recent dx AFIB, Alzheimer dementia,  recent L SDH and L hip fx ORIF 09-, BPH urinary retention w gallegos  BIBEMS for weakness and fatigue  Recent adm 09-29 to 10- adm to ICU s/p fall from standing on to carpeted floor sustaining L hip fx and L SDH.    DC to [facility] for rehab on lovenox 40 mg qd x 4 weeks  [facility]   since yesterday:  +fever +SOB +weakness.  History obtained from family at bedside when they were in ED   At time of my interview and exam, pt was alone  I was contacted by ED nurse that d-dimer was 1900; I ordered CTA  In ED /91   HR 94   RR 17   T 97.5   93% sat RA  AFIB w , O2 89% RA,  96% with 2 L nc   NS 1000 cc  CXR neg , remdesivir, decadron  Being admitted to our service for AHRF d/t  COVID-19, AFIB w RVR    Cardiology consulted for new Afib and advice regarding starting DOAC in apt. with Hx of ICH, recurent Falls and dementia.     PAST MEDICAL HX  AFIB dx 09-  Alzheimer dementia  BPH just started meds 10-20  Fall w L hip fx and LSDH  ICH intracranial bleeding (6years ago)  Subdural hematoma 09- L frontoparietal  Urinary retention gallegos placed 09-    PAST SURGICAL HX  ORIF Hemiarthroplasty L hip Dr. Pollack 09-    SOCIAL HX  lives w family      MEDICATIONS  (STANDING):  acetaminophen     Tablet .. 975 milliGRAM(s) Oral every 8 hours  cefTRIAXone Injectable. 1000 milliGRAM(s) IV Push every 24 hours  chlorhexidine 4% Liquid 1 Application(s) Topical <User Schedule>  dexAMETHasone  Injectable 6 milliGRAM(s) IV Push daily  doxycycline monohydrate Capsule 100 milliGRAM(s) Oral every 12 hours  enoxaparin Injectable 90 milliGRAM(s) SubCutaneous every 12 hours  finasteride 5 milliGRAM(s) Oral daily  influenza  Vaccine (HIGH DOSE) 0.5 milliLiter(s) IntraMuscular once  lactated ringers. 1000 milliLiter(s) (50 mL/Hr) IV Continuous <Continuous>  melatonin 3 milliGRAM(s) Oral at bedtime  metoprolol tartrate 25 milliGRAM(s) Oral two times a day  multivitamin 1 Tablet(s) Oral daily  remdesivir  IVPB 100 milliGRAM(s) IV Intermittent every 24 hours  remdesivir  IVPB   IV Intermittent   tamsulosin 0.8 milliGRAM(s) Oral at bedtime    MEDICATIONS  (PRN):  acetaminophen     Tablet .. 650 milliGRAM(s) Oral every 4 hours PRN Temp greater or equal to 38.5C (101.3F)  albuterol    90 MICROgram(s) HFA Inhaler 2 Puff(s) Inhalation every 4 hours PRN Shortness of Breath and/or Wheezing  oxyCODONE    IR 5 milliGRAM(s) Oral every 6 hours PRN Severe Pain (7 - 10)  polyethylene glycol 3350 17 Gram(s) Oral at bedtime PRN Constipation  senna 2 Tablet(s) Oral at bedtime PRN Constipation      Allergies - No Known Allergies    REVIEW OF SYSTEMS:  CONSTITUTIONAL: No weakness, fevers or chills  Eyes: No visual changes  NECK: No pain or stiffness  RESPIRATORY: No cough, wheezing, hemoptysis; + shortness of breath  CARDIOVASCULAR: No chest pain or palpitations  GASTROINTESTINAL: No abdominal pain. No nausea, vomiting, or hematemesis; No diarrhea or constipation. No melena or hematochezia.  GENITOURINARY: No dysuria, frequency or hematuria  NEUROLOGICAL: No numbness.  SKIN: No itching or rash  All other review of systems is negative unless indicated above    VITAL SIGNS:   Vital Signs Last 24 Hrs  T(C): 36.4 (24 Oct 2024 07:37), Max: 37.4 (23 Oct 2024 21:10)  T(F): 97.5 (24 Oct 2024 07:37), Max: 99.3 (23 Oct 2024 21:10)  HR: 104 (24 Oct 2024 07:37) (94 - 104)  BP: 117/64 (24 Oct 2024 07:37) (98/70 - 140/75)  BP(mean): --  RR: 18 (24 Oct 2024 07:37) (18 - 18)  SpO2: 94% (24 Oct 2024 07:37) (94% - 95%)    Parameters below as of 24 Oct 2024 07:37  Patient On (Oxygen Delivery Method): nasal cannula        I&O's Summary    23 Oct 2024 07:01  -  24 Oct 2024 07:00  --------------------------------------------------------  IN: 0 mL / OUT: 300 mL / NET: -300 mL        PHYSICAL EXAM:  Constitutional: NAD, awake and alert  HEENT:  EOMI,  Pupils round, No oral cyanosis.  Pulmonary: Non-labored, breath sounds are clear bilaterally, No wheezing, rales or rhonchi  Cardiovascular: S1 and S2, regular rate and rhythm, no Murmurs, gallops or rubs  Gastrointestinal: Bowel Sounds present, soft, nontender.   Lymph: No peripheral edema. No cervical lymphadenopathy.  Neurological: Alert, no focal deficits  Skin: No rashes.  Psych:  Mood & affect appropriate    LABS:                        12.8   11.00 )-----------( 256      ( 24 Oct 2024 06:42 )             40.1                         13.8   10.26 )-----------( 226      ( 22 Oct 2024 17:24 )             43.1     24 Oct 2024 06:42    143    |  113    |  47     ----------------------------<  140    4.4     |  22     |  1.73   23 Oct 2024 07:17    x      |  x      |  x      ----------------------------<  x      x       |  x      |  1.25   22 Oct 2024 17:24    142    |  110    |  24     ----------------------------<  123    4.7     |  27     |  1.69     Ca    9.0        24 Oct 2024 06:42  Ca    9.0        22 Oct 2024 17:24  Phos  3.7       23 Oct 2024 07:17  Mg     2.6       23 Oct 2024 07:17    TPro  x      /  Alb  x      /  TBili  x      /  DBili  0.3    /  AST  x      /  ALT  x      /  AlkPhos  x      24 Oct 2024 06:42  TPro  6.8    /  Alb  2.2    /  TBili  1.2    /  DBili  0.5    /  AST  97     /  ALT  72     /  AlkPhos  116    23 Oct 2024 07:17  TPro  7.1    /  Alb  2.5    /  TBili  2.3    /  DBili  x      /  AST  95     /  ALT  62     /  AlkPhos  120    22 Oct 2024 17:24    PT/INR - ( 24 Oct 2024 06:42 )   PT: 17.5 sec;   INR: 1.53 ratio         PTT - ( 22 Oct 2024 18:19 )  PTT:33.0 sec      Radiology/EKG/TTE: reviewed     < from: TTE W or WO Ultrasound Enhancing Agent (09.29.24 @ 10:06) >  CONCLUSIONS:      1. Left ventricular cavity is normal in size. Left ventricular wall thickness is normal. Left ventricular systolic function is normal with an ejection fraction visually estimated at 60 to 65 %.   2. The left ventricular diastolic function is indeterminate.   3. Mildly enlarged right ventricular cavity size.   4. Left atrium is severely dilated.   5. The right atrium is moderately dilated.   6. Mild to moderate mitral regurgitation.   7. Mild tricuspid regurgitation.   8. Estimated pulmonary artery systolic pressure is 44 mmHg, consistent with mild pulmonary hypertension.   9. Fibrocalcific aortic valve sclerosis without stenosis.  10. Mild aortic regurgitation.    ________________________________________________________________________________________    < end of copied text >  < from: 12 Lead ECG (10.22.24 @ 17:28) >  Diagnosis Line Atrial fibrillation with premature ventricular or aberrantly conducted complexes  Left axis deviation  Right bundle branch block  Abnormal ECG  When compared with ECG of 29-SEP-2024 08:56,  Criteria for Inferior infarct are no longer Present  T wave inversion now evident in Anterior leads  Confirmed by MONE NEVAREZ ([id number]) on 10/23/2024 9:35:26 AM    < end of copied text >             CHIEF COMPLAINT: Patient is a 89y old  Male who presents with a chief complaint of AHRF & Afib w/ RVR (23 Oct 2024 12:11)    HPI:  89 year old male w recent dx AFIB, Alzheimer dementia,  recent L SDH and L hip fx ORIF 09-, BPH urinary retention w gallegos  BIBEMS for weakness and fatigue  Recent adm 09-29 to 10- adm to ICU s/p fall from standing on to carpeted floor sustaining L hip fx and L SDH.    DC to [facility] for rehab on lovenox 40 mg qd x 4 weeks  [facility]   since yesterday:  +fever +SOB +weakness.  History obtained from family at bedside when they were in ED   At time of my interview and exam, pt was alone  I was contacted by ED nurse that d-dimer was 1900; I ordered CTA  In ED /91   HR 94   RR 17   T 97.5   93% sat RA  AFIB w , O2 89% RA,  96% with 2 L nc   NS 1000 cc  CXR neg , remdesivir, decadron  Being admitted to our service for AHRF d/t  COVID-19, AFIB w RVR    Cardiology consulted for new Afib and advice regarding starting DOAC in apt. with Hx of ICH, recurent Falls and dementia.     PAST MEDICAL HX  AFIB dx 09-  Alzheimer dementia  BPH just started meds 10-20  Fall w L hip fx and LSDH  ICH intracranial bleeding (6years ago)  Subdural hematoma 09- L frontoparietal  Urinary retention gallegos placed 09-    PAST SURGICAL HX  ORIF Hemiarthroplasty L hip Dr. Pollack 09-    SOCIAL HX  lives w family      MEDICATIONS  (STANDING):  acetaminophen     Tablet .. 975 milliGRAM(s) Oral every 8 hours  cefTRIAXone Injectable. 1000 milliGRAM(s) IV Push every 24 hours  chlorhexidine 4% Liquid 1 Application(s) Topical <User Schedule>  dexAMETHasone  Injectable 6 milliGRAM(s) IV Push daily  doxycycline monohydrate Capsule 100 milliGRAM(s) Oral every 12 hours  enoxaparin Injectable 90 milliGRAM(s) SubCutaneous every 12 hours  finasteride 5 milliGRAM(s) Oral daily  influenza  Vaccine (HIGH DOSE) 0.5 milliLiter(s) IntraMuscular once  lactated ringers. 1000 milliLiter(s) (50 mL/Hr) IV Continuous <Continuous>  melatonin 3 milliGRAM(s) Oral at bedtime  metoprolol tartrate 25 milliGRAM(s) Oral two times a day  multivitamin 1 Tablet(s) Oral daily  remdesivir  IVPB 100 milliGRAM(s) IV Intermittent every 24 hours  remdesivir  IVPB   IV Intermittent   tamsulosin 0.8 milliGRAM(s) Oral at bedtime    MEDICATIONS  (PRN):  acetaminophen     Tablet .. 650 milliGRAM(s) Oral every 4 hours PRN Temp greater or equal to 38.5C (101.3F)  albuterol    90 MICROgram(s) HFA Inhaler 2 Puff(s) Inhalation every 4 hours PRN Shortness of Breath and/or Wheezing  oxyCODONE    IR 5 milliGRAM(s) Oral every 6 hours PRN Severe Pain (7 - 10)  polyethylene glycol 3350 17 Gram(s) Oral at bedtime PRN Constipation  senna 2 Tablet(s) Oral at bedtime PRN Constipation      Allergies - No Known Allergies    REVIEW OF SYSTEMS:  CONSTITUTIONAL: No weakness, fevers or chills  Eyes: No visual changes  NECK: No pain or stiffness  RESPIRATORY: No cough, wheezing, hemoptysis; + shortness of breath  CARDIOVASCULAR: No chest pain or palpitations  GASTROINTESTINAL: No abdominal pain. No nausea, vomiting, or hematemesis; No diarrhea or constipation. No melena or hematochezia.  GENITOURINARY: No dysuria, frequency or hematuria  NEUROLOGICAL: No numbness.  SKIN: No itching or rash  All other review of systems is negative unless indicated above    VITAL SIGNS:   Vital Signs Last 24 Hrs  T(C): 36.4 (24 Oct 2024 07:37), Max: 37.4 (23 Oct 2024 21:10)  T(F): 97.5 (24 Oct 2024 07:37), Max: 99.3 (23 Oct 2024 21:10)  HR: 104 (24 Oct 2024 07:37) (94 - 104)  BP: 117/64 (24 Oct 2024 07:37) (98/70 - 140/75)  RR: 18 (24 Oct 2024 07:37) (18 - 18)  SpO2: 94% (24 Oct 2024 07:37) (94% - 95%)  Patient On (Oxygen Delivery Method): nasal cannula    PHYSICAL EXAM:  Constitutional: NAD, awake  HEENT:  No oral cyanosis.  Pulmonary: Non-labored, breath sounds are clear bilaterally  Cardiovascular: S1 and S2, irregular, normal rate  Gastrointestinal: Bowel Sounds present, soft, nontender.   Lymph: No edema. No cervical lymphadenopathy.  Neurological: Moves all extremities, interactive, no focal deficits  Skin: No rashes.  Psych:  Mood & affect appropriate    LABS:                12.8   11.00 )-----------( 256      ( 24 Oct 2024 06:42 )             40.1             143    |  113    |  47     ----------------------------<  140    4.4     |  22     |  1.73     PT/INR - ( 24 Oct 2024 06:42 )   PT: 17.5 sec;   INR: 1.53 ratio    PTT - ( 22 Oct 2024 18:19 )  PTT:33.0 sec    TTE W or WO Ultrasound Enhancing Agent (09.29.24 @ 10:06) >   1. Left ventricular cavity is normal in size. Left ventricular wall thickness is normal. Left ventricular systolic function is normal with an ejection fraction visually estimated at 60 to 65 %.   2. The left ventricular diastolic function is indeterminate.   3. Mildly enlarged right ventricular cavity size.   4. Left atrium is severely dilated.   5. The right atrium is moderately dilated.   6. Mild to moderate mitral regurgitation.   7. Mild tricuspid regurgitation.   8. Estimated pulmonary artery systolic pressure is 44 mmHg, consistent with mild pulmonary hypertension.   9. Fibrocalcific aortic valve sclerosis without stenosis.  10. Mild aortic regurgitation.    12 Lead ECG (10.22.24 @ 17:28) >  Atrial fibrillation with premature ventricular or aberrantly conducted complexes  Left axis deviation  Right bundle branch block  Abnormal ECG

## 2024-10-24 NOTE — CONSULT NOTE ADULT - ASSESSMENT
Process of Care  --Reviewed dx/treatment problems and alignment with Goals of Care    COVID 19  c/w remdesivir  cw demethasone  prn albuterol   on isolation    afib rvr  rate regular today  monitor and continue w rate control  pt on AC     SDH   neurosurgery aware- prior sdh and not signs of acute    Physical Aspects of Care  --Pain  patient denies at this time  c/w current managment    --Bowel Regimen  denies constipation  risk for constipation d/t immobility  daily dulcolax    --Dyspnea  No SOB at this time  comfortable and in NAD    --Nausea Vomiting  denies    --Weakness  PT as tolerated     Psychological and Psychiatric Aspects of Care:   --Greif/Bereavment: emotional support provided  --Hx of psychiatric dx: none  -Pastoral Care Available PRN     Social Aspects of Care  -SW involved     Cultural Aspects  -Primary Language: English    Goals of Care:     We discussed Palliative Care team being a supportive team when a patient has ongoing illnesses.  We also discussed that it is not an end of life care service, but can help navigate symptoms and emotional support througout their hospital stay here.    Hospice was explained as well  as an end of life care philosophy.  When a disease cannot be cured, or family/patient decide the treatment burdens out weigh the risk and one choses to change focus of treatment from cure to quality/comfort.       Prognosis: Death can occur at anytime, but if disease continues to progress naturally patient likely has days to weeks.    Ethical and Legal Aspects:   NA        Capacity: none  HCP/Surrogate:  Code Status:  MOLST:  Dispo Plan:    Discussed With: Case coordinated with attending and SW and RN     Time Spent: 90 minutes including the care, coordination and counseling of this patient, 50% of which was spent coordinating and counseling.

## 2024-10-24 NOTE — CONSULT NOTE ADULT - ASSESSMENT
89 year old male w recent dx AFIB, Alzheimer dementia,  recent L SDH and L hip fx ORIF 09-, BPH urinary retention w gallegos -BIBEMS for weakness and fatigue Recent adm 09-29 to 10- adm to ICU s/p fall from standing on to carpeted floor sustaining L hip fx and L SDH.  DC to [facility] for rehab on lovenox 40 mg qd x 4 weeks Recently at [facility] rehab where he contracted COVID +fever +SOB +weakness.  History obtained from family at bedside when they were in ED - In ED /91   HR 94   RR 17   T 97.5   93% sat RA AFIB w , O2 89% RA,  96% with 2 L nc  - NS 1000 cc CXR neg. Being admitted to our service for AHRF d/t COVID-19, AFIB w RVR, imaging shows Patchy opacities in the posterior segments of both upper lobes as well as within the right middle and right lower lobes are noted as described above. Exact etiology is unclear. One of the differential diagnostic consideration includes dependent atelectasis. Started on IV abx - remdesivir, decadron, abx.     1. Acute respiratory failure. Covid-19 Viral Syndrome. Multifocal pneumonia. Alzheimers dementia. BPH/Urinary retention. VICTORIA  - imaging reviewed  - on remdesivir, decadron #2  - on rocephin/doxycycline for superimposed bacterial pna coverage  - continue with above abx coverage   - monitor temps  - tolerating abx well so far; no side effects noted  - reason for abx use and side effects reviewed with patient  - supportive care  - fu cbc  - monitor temps  - fu cultures    Clinical team may change from intravenous to oral antibiotics when the following criteria are met:   1. Patient is clinically improving/stable       a)	Improved signs and symptoms of infection from initial presentation       b)	Afebrile for 24 hours       c)	Leukocytosis trending towards normal range   2. Patient is tolerating oral intake   3. Initial/repeat blood cultures are negative   .  Cannot advise changing to oral antibiotic therapy until culture sensitivity is available.

## 2024-10-24 NOTE — CONSULT NOTE ADULT - SUBJECTIVE AND OBJECTIVE BOX
HPI:   89yom, poor historian, admitted for AHRF and Afib w/ RVR, pmhx of dementia and s/p ORIF for hip fx on 09/29/24 and SDH after a fall at home examined and seen this morning. Pt. states no acute complaints at this time. Spoke with daughter on the phone regarding advanced directives. Daughter states pt. has not had a BM since friday. Denies SOB, cough, fever, aches, fatigue, CP, n/v/d, abd. pain, palpitations, dizziness.        10/24  Patient was seen and examined.  Denies nausea, vomiting, shortness of breath, chest pain, headaches, abdominal pain, constipation   pt w/ some confusion not sure why he came to the hospital was unaware where he was until redirected.     PAIN: ( ) YES  ( X)  NO  Pt unable to characterise d/t clinical status     DYSPNEA ( ) Yes ( X) No  Patient unable to characterise d/t clinical status     PAST MEDICAL & SURGICAL HISTORY:  Dementia      Hard of hearing          SOCIAL HX:    Hx opiate tolerance ( )YES  (x )NO    Baseline ADLs  (Prior to Admission)  ( x) Independent   ( )Dependent    FAMILY HISTORY:    Review of Systems:    Anxiety- denies  Depression-denies  Physical Discomfort- in NAD  Dyspnea-denies  Constipation-denies  Diarrhea-denies  Nausea-denies  Vomiting-denies  Anorexia-denies  Weight Loss- denies  Cough-+  Secretions-denies  Fatigue-+  Weakness-+  Delirium-+    All other systems reviewed and negative     PHYSICAL EXAM:    Vital Signs Last 24 Hrs  T(C): 36.4 (24 Oct 2024 07:37), Max: 37.4 (23 Oct 2024 21:10)  T(F): 97.5 (24 Oct 2024 07:37), Max: 99.3 (23 Oct 2024 21:10)  HR: 104 (24 Oct 2024 07:37) (94 - 104)  BP: 117/64 (24 Oct 2024 07:37) (98/70 - 140/75)  BP(mean): --  RR: 18 (24 Oct 2024 07:37) (18 - 18)  SpO2: 94% (24 Oct 2024 07:37) (94% - 95%)    Parameters below as of 24 Oct 2024 07:37  Patient On (Oxygen Delivery Method): nasal cannula      Daily     Daily     PPSV2:   40%  FAST:    General: calm in NAD  Mental Status: awake alert oriented x1  HEENT: eomi, perrl  Lungs: ctabl b/l bs  Cardiac: s1s2 no mgr  GI: soft nontender +BS  : voids  Ext: moves all 4 extremities spontaneously  Neuro: no gross findings     LABS:                        12.8   11.00 )-----------( 256      ( 24 Oct 2024 06:42 )             40.1     10-24    143  |  113[H]  |  47[H]  ----------------------------<  140[H]  4.4   |  22  |  1.73[H]    Ca    9.0      24 Oct 2024 06:42  Phos  3.7     10-23  Mg     2.6     10-23    TPro  x   /  Alb  x   /  TBili  x   /  DBili  0.3  /  AST  x   /  ALT  x   /  AlkPhos  x   10-24    PT/INR - ( 24 Oct 2024 06:42 )   PT: 17.5 sec;   INR: 1.53 ratio         PTT - ( 22 Oct 2024 18:19 )  PTT:33.0 sec  Albumin: Albumin: 2.2 g/dL (10-23 @ 07:17)      Allergies    No Known Allergies    Intolerances      MEDICATIONS  (STANDING):  acetaminophen     Tablet .. 975 milliGRAM(s) Oral every 8 hours  cefTRIAXone Injectable. 1000 milliGRAM(s) IV Push every 24 hours  chlorhexidine 4% Liquid 1 Application(s) Topical <User Schedule>  dexAMETHasone  Injectable 6 milliGRAM(s) IV Push daily  doxycycline monohydrate Capsule 100 milliGRAM(s) Oral every 12 hours  enoxaparin Injectable 90 milliGRAM(s) SubCutaneous every 12 hours  finasteride 5 milliGRAM(s) Oral daily  influenza  Vaccine (HIGH DOSE) 0.5 milliLiter(s) IntraMuscular once  lactated ringers. 1000 milliLiter(s) (50 mL/Hr) IV Continuous <Continuous>  melatonin 3 milliGRAM(s) Oral at bedtime  metoprolol tartrate 25 milliGRAM(s) Oral two times a day  multivitamin 1 Tablet(s) Oral daily  remdesivir  IVPB 100 milliGRAM(s) IV Intermittent every 24 hours  remdesivir  IVPB   IV Intermittent   tamsulosin 0.8 milliGRAM(s) Oral at bedtime    MEDICATIONS  (PRN):  acetaminophen     Tablet .. 650 milliGRAM(s) Oral every 4 hours PRN Temp greater or equal to 38.5C (101.3F)  albuterol    90 MICROgram(s) HFA Inhaler 2 Puff(s) Inhalation every 4 hours PRN Shortness of Breath and/or Wheezing  oxyCODONE    IR 5 milliGRAM(s) Oral every 6 hours PRN Severe Pain (7 - 10)  polyethylene glycol 3350 17 Gram(s) Oral at bedtime PRN Constipation  senna 2 Tablet(s) Oral at bedtime PRN Constipation      RADIOLOGY/ADDITIONAL STUDIES:   HPI:   89yom, poor historian, admitted for AHRF and Afib w/ RVR, pmhx of dementia and s/p ORIF for hip fx on 09/29/24 and SDH after a fall at home examined and seen this morning. Pt. states no acute complaints at this time. Spoke with daughter on the phone regarding advanced directives. Daughter states pt. has not had a BM since friday. Denies SOB, cough, fever, aches, fatigue, CP, n/v/d, abd. pain, palpitations, dizziness.        10/24  Patient was seen and examined.  Denies nausea, vomiting, shortness of breath, chest pain, headaches, abdominal pain, constipation   pt w/ some confusion not sure why he came to the hospital was unaware where he was until redirected.       PAIN: ( ) YES  ( X)  NO  Pt unable to characterise d/t clinical status     DYSPNEA ( ) Yes ( X) No  Patient unable to characterise d/t clinical status     PAST MEDICAL & SURGICAL HISTORY:  Dementia      Hard of hearing          SOCIAL HX:    Hx opiate tolerance ( )YES  (x )NO    Baseline ADLs  (Prior to Admission)  ( x) Independent   ( )Dependent    FAMILY HISTORY:    Review of Systems:    Anxiety- denies  Depression-denies  Physical Discomfort- in NAD  Dyspnea-denies  Constipation-denies  Diarrhea-denies  Nausea-denies  Vomiting-denies  Anorexia-denies  Weight Loss- denies  Cough-+  Secretions-denies  Fatigue-+  Weakness-+  Delirium-+    All other systems reviewed and negative     PHYSICAL EXAM:    Vital Signs Last 24 Hrs  T(C): 36.4 (24 Oct 2024 07:37), Max: 37.4 (23 Oct 2024 21:10)  T(F): 97.5 (24 Oct 2024 07:37), Max: 99.3 (23 Oct 2024 21:10)  HR: 104 (24 Oct 2024 07:37) (94 - 104)  BP: 117/64 (24 Oct 2024 07:37) (98/70 - 140/75)  BP(mean): --  RR: 18 (24 Oct 2024 07:37) (18 - 18)  SpO2: 94% (24 Oct 2024 07:37) (94% - 95%)    Parameters below as of 24 Oct 2024 07:37  Patient On (Oxygen Delivery Method): nasal cannula      Daily     Daily     PPSV2:   40%  FAST:    General: calm in NAD  Mental Status: awake alert oriented x1  HEENT: eomi, perrl  Lungs: ctabl b/l bs  Cardiac: s1s2 no mgr  GI: soft nontender +BS  : voids  Ext: moves all 4 extremities spontaneously  Neuro: no gross findings     LABS:                        12.8   11.00 )-----------( 256      ( 24 Oct 2024 06:42 )             40.1     10-24    143  |  113[H]  |  47[H]  ----------------------------<  140[H]  4.4   |  22  |  1.73[H]    Ca    9.0      24 Oct 2024 06:42  Phos  3.7     10-23  Mg     2.6     10-23    TPro  x   /  Alb  x   /  TBili  x   /  DBili  0.3  /  AST  x   /  ALT  x   /  AlkPhos  x   10-24    PT/INR - ( 24 Oct 2024 06:42 )   PT: 17.5 sec;   INR: 1.53 ratio         PTT - ( 22 Oct 2024 18:19 )  PTT:33.0 sec  Albumin: Albumin: 2.2 g/dL (10-23 @ 07:17)      Allergies    No Known Allergies    Intolerances      MEDICATIONS  (STANDING):  acetaminophen     Tablet .. 975 milliGRAM(s) Oral every 8 hours  cefTRIAXone Injectable. 1000 milliGRAM(s) IV Push every 24 hours  chlorhexidine 4% Liquid 1 Application(s) Topical <User Schedule>  dexAMETHasone  Injectable 6 milliGRAM(s) IV Push daily  doxycycline monohydrate Capsule 100 milliGRAM(s) Oral every 12 hours  enoxaparin Injectable 90 milliGRAM(s) SubCutaneous every 12 hours  finasteride 5 milliGRAM(s) Oral daily  influenza  Vaccine (HIGH DOSE) 0.5 milliLiter(s) IntraMuscular once  lactated ringers. 1000 milliLiter(s) (50 mL/Hr) IV Continuous <Continuous>  melatonin 3 milliGRAM(s) Oral at bedtime  metoprolol tartrate 25 milliGRAM(s) Oral two times a day  multivitamin 1 Tablet(s) Oral daily  remdesivir  IVPB 100 milliGRAM(s) IV Intermittent every 24 hours  remdesivir  IVPB   IV Intermittent   tamsulosin 0.8 milliGRAM(s) Oral at bedtime    MEDICATIONS  (PRN):  acetaminophen     Tablet .. 650 milliGRAM(s) Oral every 4 hours PRN Temp greater or equal to 38.5C (101.3F)  albuterol    90 MICROgram(s) HFA Inhaler 2 Puff(s) Inhalation every 4 hours PRN Shortness of Breath and/or Wheezing  oxyCODONE    IR 5 milliGRAM(s) Oral every 6 hours PRN Severe Pain (7 - 10)  polyethylene glycol 3350 17 Gram(s) Oral at bedtime PRN Constipation  senna 2 Tablet(s) Oral at bedtime PRN Constipation      RADIOLOGY/ADDITIONAL STUDIES:

## 2024-10-25 NOTE — PROGRESS NOTE ADULT - PROBLEM SELECTOR PLAN 1
·  Problem: PAF (paroxysmal atrial fibrillation).   ·  Recommendation: pt. with with new Afib, likely due to COVID PNA  - Echo from 9/29/24 shows preserved LVEF 60-65%, severely dilated LA, mild to moderate MR  - Neurosurgery consult appreciated - cleared pt. for DOAC  -  CHADsVASC=2, Afib rate not controlled, will increase BB to metoprolol tartrate 50 mg po bid, cont. AC with lovenox - transition to DOAC on Dc  as pt is at high risk of stroke, the benefit of restarting a DOAC would outweigh the risk of Fall/rebleeding at this time

## 2024-10-26 NOTE — PROGRESS NOTE ADULT - PROBLEM SELECTOR PLAN 1
pt. with with new Afib, likely due to COVID PNA  - Echo from 9/29/24 shows preserved LVEF 60-65%, severely dilated LA, mild to moderate MR  - Neurosurgery consult appreciated - cleared pt. for DOAC  -  CHADsVASC=2    -rate controlled improved today. cont. metoprolol tartrate 50 mg po bid,   -cont. AC with lovenox - transition to DOAC on Dc  as pt is at high risk of stroke, the benefit of restarting a DOAC would outweigh the risk of Fall/rebleeding at this time

## 2024-10-26 NOTE — PROGRESS NOTE ADULT - TIME BILLING
extensive review of patient's medical chart including prior hospital encounters, current admission progress notes, labs, imaging and other testing, seeing and evaluating patient at bedside, explaining to patient regarding current condition and plan of care, then ordering tests and collaborating with specialty consultants including Infectious Disease, Cardiology, Palliative Care, and finally, documenting today's findings and plan.

## 2024-10-27 NOTE — PROGRESS NOTE ADULT - REASON FOR ADMISSION
AHRF and AFib w/ RVR
Acute respiratory failure with hypoxia, COVID19 pneumonia
AHRF & Afib w/ RVR
Acute respiratory failure with hypoxia, COVID19 pneumonia
Afib with RVR

## 2024-10-27 NOTE — PROGRESS NOTE ADULT - PROBLEM SELECTOR PLAN 1
pt. with with new Afib, likely due to COVID PNA  - Echo from 9/29/24 shows preserved LVEF 60-65%, severely dilated LA, mild to moderate MR  - Neurosurgery consult appreciated - cleared pt. for DOAC  -  CHADsVASC=2    -cont. metoprolol tartrate 50 mg po bid,   -cont. AC with lovenox - transition to DOAC on Dc  as pt is at high risk of stroke, the benefit of restarting a DOAC would outweigh the risk of Fall/rebleeding at this time patient w/ afib. this was diagnosed 9/29/'24 when pt needed preop clearance  unlikely just triggered by COVID.    - Echo from 9/29/24 shows preserved LVEF 60-65%, severely dilated LA, mild to moderate MR  - Neurosurgery consult appreciated - cleared pt. for DOAC  -  CHADsVASC=2    -cont. metoprolol tartrate 50 mg po bid,   -cont. AC with lovenox - transition to DOAC on Dc  as pt is at high risk of stroke, the benefit of restarting a DOAC would outweigh the risk of Fall/rebleeding at this time

## 2024-10-27 NOTE — PROGRESS NOTE ADULT - PROBLEM SELECTOR PLAN 2
·  Problem: 2019 novel coronavirus disease (COVID-19).   ·  Recommendation: on iv remdesevir and dexamethasone, management as per Hospitalist
on iv remdesevir and dexamethasone, management as per Hospitalist
on iv remdesevir and dexamethasone, management as per Hospitalist

## 2024-10-28 NOTE — CONSULT NOTE ADULT - ASSESSMENT
A:    89yMale  HD #    Here for:    1.    This patient requires critical care for support of one or more vital organ systems with a high probability of imminent or life threatening deterioration in his/her condition    P:    Neuro: GCS 15. Monitor for delirium.  Continue to optimize pain control. Serial Neurologic assessments.    HEENT: No issues.    CV: Continue hemodynamic monitoring    Pulm: Pulmonary toilet.  Continue incentive spirometer.  Chest PT.  Encourage OOB to chair and ambulation. Nebs. f/u ABG, CXR.    GI/Nutrition: Cont diet, bowel regimen.    /Renal: Monitor UOP. Monitor BMP.  Replete Lytes as needed.    HEME- Chemical and mechanical DVT ppx. f/u CBC. f/u coags as needed.    ID:  Cont abx. f/u Cx's.    Lines/Tubes:     Endo: Maintain euglycemia.    Skin:  Cont skin care, pressure ulcer prevention.    Dispo: Cont critical care.    Date of entry of this note is equal to the date of services rendered    TOTAL CRITICAL CARE TIME:   (EXCLUSIVE of any non bundled procedures)    Note: This is a critically ill patient. Time spent has been in salvage of life, limb and vital organ systems. This time INCLUDES time spent directly as this patient's bedside with evaluation and management, review of chart including review of laboratory and imaging studies, interpretation of vital signs and cardiac output measurements, any necessary ventilator management, and time spent discussing plan of care with patient and family, including goals of care discussion. This also includes time spent in multidisciplinary discussion with care team and various consultants to optimize treatment plan. This time may NOT include various procedures, which will be noted seperately.    A:    89M    Here for:    1. AHRF  2. Septic shock 2/2  3. COVID PNA, with likely  4. Superimposed bacterial PNA  5. MSOF  6. VICTORIA  7. Anemia  8. SIGRID    This patient requires critical care for support of one or more vital organ systems with a high probability of imminent or life threatening deterioration in his/her condition    P:    MSOF 2/2 COVID PNA, suspected superimposed bacterial PNA  Organ systems failing; CNS, renal, cardiac, pulmonary, hematologic  Acute decompensation this AM    Sepsis bundle  HFNC, actively titrate to SpO2 > 90% and patient comfort  Initiate vasopressor therapy; levophed, but tachycardic change to phenylephrine   SIGRID, Hx of AF; cannot tolerate AVNB therapy at this time, may require amiodarone; EF ~ 60%  On doxy PO; add pip/tazo, change to doxy IV; white count rising  Hgb drip 4-5g in 2 days, no obvious GIB or source of bleeding. give 1u pRBC now, consent obtained, send stool guiac  VICTORIA, triple of Cr over several days 2/2 ATN from sepsis; hydrate, monitor UOP, may require gallegos  d/c lovenox given anemia  f/u lytes, replete PRN  IVF resuscitation  Avoid painful procedure per family, so run pressors peripherally as much as safe and possible    Discussed with son Ziggy Elias  and dtr Crow . We discussed acute events, worsening of status, and plan of care. Crow expresses anger and blame towards NH about his condition. He has had poor intake for weeks. We discussed his multi system organ failure and precipitous condition; I explained that from our view, Frederic is dying; his organs are failing, and his condition is worsening rapidly. He is 89 yo with dementia, a subacute SDH from a fall and COVID. After discussion among family, decision made to set limitation of DNR/DNI, otherwise continue to treat. They will try to come in, weighing risk of being exposed to COVID. EROS placed in chart.    Dispo: Admit to critical care. Continue resuscitation with limits of DNR/I, continue aggressive medical treatment beyond this.     Discussed with Dr Rowland    Date of entry of this note is equal to the date of services rendered    TOTAL CRITICAL CARE TIME: 110 minutes (EXCLUSIVE of any non bundled procedures)    Note: This is a critically ill patient. Time spent has been in salvage of life, limb and vital organ systems. This time INCLUDES time spent directly as this patient's bedside with evaluation and management, review of chart including review of laboratory and imaging studies, interpretation of vital signs and cardiac output measurements, any necessary ventilator management, and time spent discussing plan of care with patient and family, including goals of care discussion. This also includes time spent in multidisciplinary discussion with care team and various consultants to optimize treatment plan. This time may NOT include various procedures, which will be noted seperately.

## 2024-10-28 NOTE — PROVIDER CONTACT NOTE (OTHER) - ACTION/TREATMENT ORDERED:
STAT Labs ordered, B/L wrist restraints ordered. STAT Labs ordered, B/L wrist restraints ordered. As per NP Hollie, stop IVF. Recheck BP 72/40 manually. NP Hollie notified, rapid response called as per NP.

## 2024-10-28 NOTE — CONSULT NOTE ADULT - SUBJECTIVE AND OBJECTIVE BOX
ICU Consult Note    HPI:    S:    Pt seen and examined  This is a 89 y/o M with pmhx of afib, Alzheimer's dementia, L hip fracture s/p ORIF, subdural hematoma 9/29/24, wo presented to  with fever, dyspnea, weakness, found to have acute hypoxic respiratory failure, COVID19 infection, CT chest imaging consistent with COVID19 pneumonia, No PE, and Hyponatremic from free water deficit. CT head redemonstrated subdural hematoma- neuro following. Called by RN for hypotension, hypothermia 94.3F and unable to swallow meds this evening SBP 70's.  Pt seen and evaluated at bedside. Pt weak and deconditioned, poor appetite with low PO intake.     RRT 10/27 AM for hypotension, AHRF, XF to ICU svc    10/28: On AHRF. Lethargic, response to voice and pain, largely non verbal. Being resuscitated, pressors hanging.     ROS: UTO        Allergies    No Known Allergies    Intolerances        MEDICATIONS  (STANDING):  acetaminophen     Tablet .. 975 milliGRAM(s) Oral every 8 hours  chlorhexidine 4% Liquid 1 Application(s) Topical <User Schedule>  dexAMETHasone  Injectable 6 milliGRAM(s) IV Push daily  dextrose 5%. 1000 milliLiter(s) (75 mL/Hr) IV Continuous <Continuous>  doxycycline monohydrate Capsule 100 milliGRAM(s) Oral every 12 hours  enoxaparin Injectable 90 milliGRAM(s) SubCutaneous every 12 hours  finasteride 5 milliGRAM(s) Oral daily  influenza  Vaccine (HIGH DOSE) 0.5 milliLiter(s) IntraMuscular once  melatonin 3 milliGRAM(s) Oral at bedtime  metoprolol tartrate 50 milliGRAM(s) Oral two times a day  multivitamin 1 Tablet(s) Oral daily  sodium chloride 0.45%. 1000 milliLiter(s) (1000 mL/Hr) IV Continuous <Continuous>  tamsulosin 0.8 milliGRAM(s) Oral at bedtime  thiamine 100 milliGRAM(s) Oral at bedtime    MEDICATIONS  (PRN):  acetaminophen     Tablet .. 650 milliGRAM(s) Oral every 4 hours PRN Temp greater or equal to 38.5C (101.3F)  albuterol    90 MICROgram(s) HFA Inhaler 2 Puff(s) Inhalation every 4 hours PRN Shortness of Breath and/or Wheezing  oxyCODONE    IR 5 milliGRAM(s) Oral every 8 hours PRN Severe Pain (7 - 10)  polyethylene glycol 3350 17 Gram(s) Oral at bedtime PRN Constipation  senna 2 Tablet(s) Oral at bedtime PRN Constipation      Drug Dosing Weight  Height (cm): 182.9 (22 Oct 2024 17:07)  Weight (kg): 86.4 (23 Oct 2024 02:54)  BMI (kg/m2): 25.8 (23 Oct 2024 02:54)  BSA (m2): 2.09 (23 Oct 2024 02:54)      PAST MEDICAL & SURGICAL HISTORY:  Dementia      Hard of hearing          FAMILY HISTORY:        REVIEW OF SYSTEMS    UNLESS OTHERWISE NOTED IN HPI above:    Constitutional:  No Weight Change, No Fever, No Chills, No Night Sweats, No Fatigue, No Malaise  ENT/Mouth:  No Hearing Changes, No Ear Pain, No Nasal Congestion, No  Sinus Pain, No Hoarseness, No sore throat, No Rhinorrhea, No Swallowing  Difficulty  Eyes:  No Eye Pain, No Swelling, No Redness, No Foreign Body, No Discharge, No Vision Changes  Cardiovascular:  No Chest Pain, No SOB, No PND, No Dyspnea on Exertion,  No Orthopnea, No Claudication, No Edema, No Palpitations  Respiratory:  No Cough, No Sputum, No Wheezing, No Smoke Exposure, No Dyspnea  Gastrointestinal:  No Nausea, No Vomiting, No Diarrhea, No  Constipation, No Pain, No Heartburn, No Anorexia, No Dysphagia, No  Hematochezia, No Melena, No Flatulence, No Jaundice  Genitourinary:  No Dysmenorrhea, No DUB, No Dyspareunia, No Dysuria, No  Urinary Frequency, No Hematuria, No Urinary Incontinence, No Urgency,  No Flank Pain, No Urinary Flow Changes, No Hesitancy  Musculoskeletal:  No Arthralgias, No Myalgias, No Joint Swelling, No  Joint Stiffness, No Back Pain, No Neck Pain, No Injury History  Skin:  No Skin Lesions, No Pruritis, No Hair Changes, No Breast/Skin Changes, No Nipple Discharge  Neuro:  No Weakness, No Numbness, No Paresthesias, No Loss of  Consciousness, No Syncope, No Dizziness, No Headache, No Coordination  Changes, No Recent Falls  Psych:  No Anxiety/Panic, No Depression, No Insomnia, No Personality  Changes, No Delusions, No Rumination, No SI/HI/AH/VH, No Social Issues,  No Memory Changes, No Violence/Abuse Hx., No Eating Concerns  Heme/Lymph:  No Bruising, No Bleeding, No Transfusions History, No Lymphadenopathy  Endocrine:  No Polyuria, No Polydipsia, No Temperature Intolerance    O:    ICU Vital Signs Last 24 Hrs  T(C): 35.6 (28 Oct 2024 03:00), Max: 36.3 (27 Oct 2024 16:33)  T(F): 96 (28 Oct 2024 03:00), Max: 97.3 (27 Oct 2024 16:33)  HR: 124 (28 Oct 2024 07:18) (61 - 124)  BP: 72/40 (28 Oct 2024 06:50) (70/34 - 133/94)  BP(mean): --  ABP: --  ABP(mean): --  RR: 39 (28 Oct 2024 07:18) (18 - 39)  SpO2: 96% (28 Oct 2024 07:18) (90% - 96%)    O2 Parameters below as of 28 Oct 2024 07:18  Patient On (Oxygen Delivery Method): nasal cannula, high flow  O2 Flow (L/min): 50  O2 Concentration (%): 70            I&O's Detail          PE:    Adult lying in bed  + HFNC  + acute on chronically ill appearing M  Emaciated  No JVD  S1S2+ tachycardic  Coarse BS B/L  Abd soft NTND  No leg swelling/edema noted  Lethargic, arousable to loud voice by way of opening eyes  Skin pink, warm    LABS:    CBC Full  -  ( 28 Oct 2024 06:16 )  WBC Count : 25.30 K/uL  RBC Count : 2.11 M/uL  Hemoglobin : 6.6 g/dL  Hematocrit : 19.8 %  Platelet Count - Automated : 303 K/uL  Mean Cell Volume : 93.8 fl  Mean Cell Hemoglobin : 31.3 pg  Mean Cell Hemoglobin Concentration : 33.3 gm/dL  Auto Neutrophil # : 21.51 K/uL  Auto Lymphocyte # : 3.04 K/uL  Auto Monocyte # : 0.76 K/uL  Auto Eosinophil # : 0.00 K/uL  Auto Basophil # : 0.00 K/uL  Auto Neutrophil % : 85.0 %  Auto Lymphocyte % : 12.0 %  Auto Monocyte % : 3.0 %  Auto Eosinophil % : 0.0 %  Auto Basophil % : 0.0 %    10-28    x   |  x   |  x   ----------------------------<  x   x    |  x   |  2.78[H]    Ca    8.2[L]      28 Oct 2024 03:51  Phos  2.9     10-28  Mg     2.5     10-28    TPro  5.2[L]  /  Alb  2.0[L]  /  TBili  0.5  /  DBili  0.2  /  AST  59[H]  /  ALT  128[H]  /  AlkPhos  78  10-28    PT/INR - ( 28 Oct 2024 06:16 )   PT: 20.8 sec;   INR: 1.82 ratio           Urinalysis Basic - ( 28 Oct 2024 03:51 )    Color: x / Appearance: x / SG: x / pH: x  Gluc: 151 mg/dL / Ketone: x  / Bili: x / Urobili: x   Blood: x / Protein: x / Nitrite: x   Leuk Esterase: x / RBC: x / WBC x   Sq Epi: x / Non Sq Epi: x / Bacteria: x      CAPILLARY BLOOD GLUCOSE      POCT Blood Glucose.: 148 mg/dL (28 Oct 2024 06:51)        LIVER FUNCTIONS - ( 28 Oct 2024 06:16 )  Alb: 2.0 g/dL / Pro: 5.2 gm/dL / ALK PHOS: 78 U/L / ALT: 128 U/L / AST: 59 U/L / GGT: x                ICU Consult Note    HPI:    S:    Pt seen and examined  This is a 89 y/o M with pmhx of afib, Alzheimer's dementia, L hip fracture s/p ORIF, subdural hematoma 9/29/24, wo presented to  with fever, dyspnea, weakness, found to have acute hypoxic respiratory failure, COVID19 infection, CT chest imaging consistent with COVID19 pneumonia, No PE, and Hyponatremic from free water deficit. CT head demonstrated subdural hematoma- neuro following. Called by RN for hypotension, hypothermia 94.3F and unable to swallow meds this evening SBP 70's.  Pt seen and evaluated at bedside. Pt weak and deconditioned, poor appetite with low PO intake.     RRT 10/27 AM for hypotension, AHRF, XF to ICU svc    10/28: On AHRF. Lethargic, response to voice and pain, largely non verbal. Being resuscitated, pressors hanging.     ROS: UTO        Allergies    No Known Allergies    Intolerances        MEDICATIONS  (STANDING):  acetaminophen     Tablet .. 975 milliGRAM(s) Oral every 8 hours  chlorhexidine 4% Liquid 1 Application(s) Topical <User Schedule>  dexAMETHasone  Injectable 6 milliGRAM(s) IV Push daily  dextrose 5%. 1000 milliLiter(s) (75 mL/Hr) IV Continuous <Continuous>  doxycycline monohydrate Capsule 100 milliGRAM(s) Oral every 12 hours  enoxaparin Injectable 90 milliGRAM(s) SubCutaneous every 12 hours  finasteride 5 milliGRAM(s) Oral daily  influenza  Vaccine (HIGH DOSE) 0.5 milliLiter(s) IntraMuscular once  melatonin 3 milliGRAM(s) Oral at bedtime  metoprolol tartrate 50 milliGRAM(s) Oral two times a day  multivitamin 1 Tablet(s) Oral daily  sodium chloride 0.45%. 1000 milliLiter(s) (1000 mL/Hr) IV Continuous <Continuous>  tamsulosin 0.8 milliGRAM(s) Oral at bedtime  thiamine 100 milliGRAM(s) Oral at bedtime    MEDICATIONS  (PRN):  acetaminophen     Tablet .. 650 milliGRAM(s) Oral every 4 hours PRN Temp greater or equal to 38.5C (101.3F)  albuterol    90 MICROgram(s) HFA Inhaler 2 Puff(s) Inhalation every 4 hours PRN Shortness of Breath and/or Wheezing  oxyCODONE    IR 5 milliGRAM(s) Oral every 8 hours PRN Severe Pain (7 - 10)  polyethylene glycol 3350 17 Gram(s) Oral at bedtime PRN Constipation  senna 2 Tablet(s) Oral at bedtime PRN Constipation      Drug Dosing Weight  Height (cm): 182.9 (22 Oct 2024 17:07)  Weight (kg): 86.4 (23 Oct 2024 02:54)  BMI (kg/m2): 25.8 (23 Oct 2024 02:54)  BSA (m2): 2.09 (23 Oct 2024 02:54)      PAST MEDICAL & SURGICAL HISTORY:  Dementia      Hard of hearing          FAMILY HISTORY:        REVIEW OF SYSTEMS    UNLESS OTHERWISE NOTED IN HPI above:    Constitutional:  No Weight Change, No Fever, No Chills, No Night Sweats, No Fatigue, No Malaise  ENT/Mouth:  No Hearing Changes, No Ear Pain, No Nasal Congestion, No  Sinus Pain, No Hoarseness, No sore throat, No Rhinorrhea, No Swallowing  Difficulty  Eyes:  No Eye Pain, No Swelling, No Redness, No Foreign Body, No Discharge, No Vision Changes  Cardiovascular:  No Chest Pain, No SOB, No PND, No Dyspnea on Exertion,  No Orthopnea, No Claudication, No Edema, No Palpitations  Respiratory:  No Cough, No Sputum, No Wheezing, No Smoke Exposure, No Dyspnea  Gastrointestinal:  No Nausea, No Vomiting, No Diarrhea, No  Constipation, No Pain, No Heartburn, No Anorexia, No Dysphagia, No  Hematochezia, No Melena, No Flatulence, No Jaundice  Genitourinary:  No Dysmenorrhea, No DUB, No Dyspareunia, No Dysuria, No  Urinary Frequency, No Hematuria, No Urinary Incontinence, No Urgency,  No Flank Pain, No Urinary Flow Changes, No Hesitancy  Musculoskeletal:  No Arthralgias, No Myalgias, No Joint Swelling, No  Joint Stiffness, No Back Pain, No Neck Pain, No Injury History  Skin:  No Skin Lesions, No Pruritis, No Hair Changes, No Breast/Skin Changes, No Nipple Discharge  Neuro:  No Weakness, No Numbness, No Paresthesias, No Loss of  Consciousness, No Syncope, No Dizziness, No Headache, No Coordination  Changes, No Recent Falls  Psych:  No Anxiety/Panic, No Depression, No Insomnia, No Personality  Changes, No Delusions, No Rumination, No SI/HI/AH/VH, No Social Issues,  No Memory Changes, No Violence/Abuse Hx., No Eating Concerns  Heme/Lymph:  No Bruising, No Bleeding, No Transfusions History, No Lymphadenopathy  Endocrine:  No Polyuria, No Polydipsia, No Temperature Intolerance    O:    ICU Vital Signs Last 24 Hrs  T(C): 35.6 (28 Oct 2024 03:00), Max: 36.3 (27 Oct 2024 16:33)  T(F): 96 (28 Oct 2024 03:00), Max: 97.3 (27 Oct 2024 16:33)  HR: 124 (28 Oct 2024 07:18) (61 - 124)  BP: 72/40 (28 Oct 2024 06:50) (70/34 - 133/94)  BP(mean): --  ABP: --  ABP(mean): --  RR: 39 (28 Oct 2024 07:18) (18 - 39)  SpO2: 96% (28 Oct 2024 07:18) (90% - 96%)    O2 Parameters below as of 28 Oct 2024 07:18  Patient On (Oxygen Delivery Method): nasal cannula, high flow  O2 Flow (L/min): 50  O2 Concentration (%): 70            I&O's Detail          PE:    Adult lying in bed  + HFNC  + acute on chronically ill appearing M  Emaciated  No JVD  S1S2+ tachycardic  Coarse BS B/L  Abd soft NTND  No leg swelling/edema noted  Lethargic, arousable to loud voice by way of opening eyes  Skin pink, warm    LABS:    CBC Full  -  ( 28 Oct 2024 06:16 )  WBC Count : 25.30 K/uL  RBC Count : 2.11 M/uL  Hemoglobin : 6.6 g/dL  Hematocrit : 19.8 %  Platelet Count - Automated : 303 K/uL  Mean Cell Volume : 93.8 fl  Mean Cell Hemoglobin : 31.3 pg  Mean Cell Hemoglobin Concentration : 33.3 gm/dL  Auto Neutrophil # : 21.51 K/uL  Auto Lymphocyte # : 3.04 K/uL  Auto Monocyte # : 0.76 K/uL  Auto Eosinophil # : 0.00 K/uL  Auto Basophil # : 0.00 K/uL  Auto Neutrophil % : 85.0 %  Auto Lymphocyte % : 12.0 %  Auto Monocyte % : 3.0 %  Auto Eosinophil % : 0.0 %  Auto Basophil % : 0.0 %    10-28    x   |  x   |  x   ----------------------------<  x   x    |  x   |  2.78[H]    Ca    8.2[L]      28 Oct 2024 03:51  Phos  2.9     10-28  Mg     2.5     10-28    TPro  5.2[L]  /  Alb  2.0[L]  /  TBili  0.5  /  DBili  0.2  /  AST  59[H]  /  ALT  128[H]  /  AlkPhos  78  10-28    PT/INR - ( 28 Oct 2024 06:16 )   PT: 20.8 sec;   INR: 1.82 ratio           Urinalysis Basic - ( 28 Oct 2024 03:51 )    Color: x / Appearance: x / SG: x / pH: x  Gluc: 151 mg/dL / Ketone: x  / Bili: x / Urobili: x   Blood: x / Protein: x / Nitrite: x   Leuk Esterase: x / RBC: x / WBC x   Sq Epi: x / Non Sq Epi: x / Bacteria: x      CAPILLARY BLOOD GLUCOSE      POCT Blood Glucose.: 148 mg/dL (28 Oct 2024 06:51)        LIVER FUNCTIONS - ( 28 Oct 2024 06:16 )  Alb: 2.0 g/dL / Pro: 5.2 gm/dL / ALK PHOS: 78 U/L / ALT: 128 U/L / AST: 59 U/L / GGT: x

## 2024-10-28 NOTE — PROVIDER CONTACT NOTE (OTHER) - ASSESSMENT
Patient placed on hypothermia blanket, Patient Given 1 L NS bolus. Patient moving and restless, popped and brook first blanket, new blanket placed. Patient placed on B/l wrist restraints. Patient blader scan over 348, patient incontinent and visibly urinating on bed pad. Patient placed on hypothermia blanket, Patient Given 1 L NS bolus. Patient moving and restless, popped and broke first blanket, new blanket placed. Patient placed on B/l wrist restraints. Patient blader scan over 348, patient incontinent and visibly urinating on bed pad.

## 2024-10-29 NOTE — DIETITIAN NUTRITION RISK NOTIFICATION - ADDITIONAL COMMENTS/DIETITIAN RECOMMENDATIONS
1) C/w Regular diet + ensure plus high protein BID  2) Obtain vitamin D 25OH level to assess nutriture  3) Please obtain daily weights   4) C/w thiamine and MVI daily  5) Encourage protein-rich foods, maximize food preferences  6) Monitor bowel movements, if no BM for >3 days, consider implementing bowel regimen.  7) Consider adding appetite stimulant such as Remeron or Marinol 2/2 chronically poor appetite/ PO intake  8) Confirm goals of care regarding nutrition support - Nutrition support is not recommended due to overall declining medical status which evidenced based studies indicate EN is not effective in prolonging survival and improving quality of life. It can also increase risk of aspiration pneumonia as well as other related issues (infection, GI complications, and worsening/ non-healing PI's). However, will provide nutrition/ hydration within GOC.   Please see additional recommendations below.

## 2024-10-29 NOTE — PROGRESS NOTE ADULT - PROBLEM SELECTOR PROBLEM 1
PAF (paroxysmal atrial fibrillation)
2019 novel coronavirus disease (COVID-19)
PAF (paroxysmal atrial fibrillation)
PAF (paroxysmal atrial fibrillation)

## 2024-10-29 NOTE — DIETITIAN NUTRITION RISK NOTIFICATION - TREATMENT: THE FOLLOWING DIET HAS BEEN RECOMMENDED
Diet, Regular:   Supplement Feeding Modality:  Oral  Ensure Plus High Protein Cans or Servings Per Day:  2       Frequency:  Two Times a day (10-27-24 @ 15:35) [Active]

## 2024-10-29 NOTE — PROVIDER CONTACT NOTE (OTHER) - SITUATION
Patient hypothermic with rectal temperature of 93.3 F, and hypotensive with BP of 70/34 manually. Patient also extremely restless, unable to safely swallow oral medication. NP Gabby Browne notified.
Verbal consent given by patients daughter Crow Diggs for HIV screening.

## 2024-10-29 NOTE — DIETITIAN INITIAL EVALUATION ADULT - PERTINENT MEDS FT
MEDICATIONS  (STANDING):  acetaminophen     Tablet .. 975 milliGRAM(s) Oral every 8 hours  chlorhexidine 4% Liquid 1 Application(s) Topical <User Schedule>  dexAMETHasone  Injectable 6 milliGRAM(s) IV Push daily  dexMEDEtomidine Infusion 0.2 MICROgram(s)/kG/Hr (4.32 mL/Hr) IV Continuous <Continuous>  dextrose 5%. 1000 milliLiter(s) (75 mL/Hr) IV Continuous <Continuous>  doxycycline IVPB 100 milliGRAM(s) IV Intermittent every 12 hours  doxycycline IVPB      influenza  Vaccine (HIGH DOSE) 0.5 milliLiter(s) IntraMuscular once  multivitamin 1 Tablet(s) Oral daily  phenylephrine    Infusion 0.1 MICROgram(s)/kG/Min (3.24 mL/Hr) IV Continuous <Continuous>  piperacillin/tazobactam IVPB.. 3.375 Gram(s) IV Intermittent every 8 hours  thiamine 100 milliGRAM(s) Oral at bedtime    MEDICATIONS  (PRN):  acetaminophen     Tablet .. 650 milliGRAM(s) Oral every 4 hours PRN Temp greater or equal to 38.5C (101.3F)  albuterol    90 MICROgram(s) HFA Inhaler 2 Puff(s) Inhalation every 4 hours PRN Shortness of Breath and/or Wheezing  morphine  - Injectable 2 milliGRAM(s) IV Push every 1 hour PRN respiratory distress  oxyCODONE    IR 5 milliGRAM(s) Oral every 8 hours PRN Severe Pain (7 - 10)  polyethylene glycol 3350 17 Gram(s) Oral at bedtime PRN Constipation  senna 2 Tablet(s) Oral at bedtime PRN Constipation    Home Medications:  acetaminophen 10 mg/mL intravenous solution: 100 milliliter(s) intravenous once As needed Mild Pain (1 - 3) (02 Oct 2024 11:59)  acetaminophen 325 mg oral tablet: 3 tab(s) orally every 8 hours (02 Oct 2024 11:59)  finasteride 5 mg oral tablet: 1 tab(s) orally once a day (02 Oct 2024 11:59)  Lovenox 40 mg/0.4 mL injectable solution: 40 milligram(s) subcutaneously once a day DVT ppx post op (02 Oct 2024 11:59)  melatonin 3 mg oral tablet: 1 tab(s) orally once a day (at bedtime) (02 Oct 2024 11:59)  metoprolol tartrate 25 mg oral tablet: 1 tab(s) orally every 12 hours (02 Oct 2024 11:59)  Multiple Vitamins oral tablet: 1 tab(s) orally once a day (02 Oct 2024 11:59)  oxyCODONE 5 mg oral tablet: 1 tab(s) orally every 4 hours As needed Severe Pain (7 - 10) (02 Oct 2024 11:59)  polyethylene glycol 3350 oral powder for reconstitution: 17 gram(s) orally once a day (at bedtime) As needed Constipation (02 Oct 2024 11:59)  senna leaf extract oral tablet: 2 tab(s) orally once a day (at bedtime) As needed Constipation (02 Oct 2024 11:59)  tamsulosin 0.4 mg oral capsule: 2 cap(s) orally once a day (at bedtime) (02 Oct 2024 11:59)

## 2024-10-29 NOTE — PROVIDER CONTACT NOTE (OTHER) - NAME OF MD/NP/PA/DO NOTIFIED:
Vaccine Information Statement(s) was given today. This has been reviewed, questions answered, and verbal consent given by Patient for injection(s) and administration of Influenza (Inactivated).    1. Does the patient have a moderate to severe fever?  No  2. Has the patient had a serious reaction to a flu shot before?   No  3. Has the patient ever had Guillian [place]osed[place] Syndrome within 6 weeks of a previous flu shot?  No  4. Is the patient less that 6 months of age?  No    Patient is eligible to receive the vaccine based on all questions being answered as 'No'.    Patient tolerated without incident. See immunization grid for documentation.        
[facility]
DAVID Browne

## 2024-10-29 NOTE — DIETITIAN INITIAL EVALUATION ADULT - PERTINENT LABORATORY DATA
10-29    157[H]  |  129[H]  |  157[H]  ----------------------------<  156[H]  3.5   |  18[L]  |  2.37[H]    Ca    7.8[L]      29 Oct 2024 08:50  Phos  3.4     10-29  Mg     2.6     10-29    TPro  5.1[L]  /  Alb  1.9[L]  /  TBili  0.7  /  DBili  0.3  /  AST  147[H]  /  ALT  177[H]  /  AlkPhos  125[H]  10-29  A1C with Estimated Average Glucose Result: 5.6 % (10-23-24 @ 07:17)

## 2024-10-29 NOTE — DIETITIAN INITIAL EVALUATION ADULT - ETIOLOGY
r/t decreased ability to meet increased nutrient needs 2/2 dementia, wound healing, +COVID-19, SDH, advanced age

## 2024-10-29 NOTE — DIETITIAN INITIAL EVALUATION ADULT - OTHER INFO
89 y/o M with a PMHx of afib, Alzheimer's dementia, L hip fracture s/p ORIF, subdural hematoma 9/29/24, who presented to  with fever, dyspnea, weakness, found to have acute hypoxic respiratory failure 2/2 +COVID-19 PNA, and hyponatremic from free water deficit. CT head demonstrated subdural hematoma- neuro following. Called by RN for hypotension, hypothermia 94.3F and unable to swallow meds this evening SBP 70's. Pt weak and deconditioned, poor appetite with low PO intake. Admitted for AHRF 2/2 COVID-19 PNA, and elevated d-dimer. S/p RR (10/28) for hypotension; transferred to CCU. Pt is DNR/DNI, however TF IS NOT ADDRESSED IN [facility].    Unable to obtain meaningful information 2/2 pt's mental status. RD unable to obtain bedscale wt on 10/29 2/2 bedscale not working. RN obtained bedscale wt on 10/22 - 190#; 1+ edema may be skewing weight. Pt has likely lost weight since admission 2/2 very poor po intake. Weight hx reviewed: 178# w/ 2+ edema (taken by RN on 9/29/24; met criteria for severe malnutrition); weight gain of 12# noted - ?accuracy. Pt thin, frail appearing. NFPE reveals moderate to severe muscle/ fat wasting, pt continues to meet criteria for PCM at this time. C/w Regular diet + ensure plus high protein BID in effort to optimize PO intake. Consider adding appetite stimulant such as Remeron or Marinol 2/2 chronically poor appetite/ PO intake. Confirm goals of care regarding nutrition support - Nutrition support is not recommended due to overall declining medical status which evidenced based studies indicate EN is not effective in prolonging survival and improving quality of life. It can also increase risk of aspiration pneumonia as well as other related issues (infection, GI complications, and worsening/ non-healing PI's). However, will provide nutrition/ hydration within C. Please see additional recommendations below.

## 2024-10-29 NOTE — DIETITIAN INITIAL EVALUATION ADULT - NSFNSGIIOFT_GEN_A_CORE
I&O's Detail    28 Oct 2024 07:01  -  29 Oct 2024 07:00  --------------------------------------------------------  IN:    Dexmedetomidine: 109.7 mL    IV PiggyBack: 500 mL    Lactated Ringers Bolus: 1700 mL    Phenylephrine: 550 mL    PRBCs (Packed Red Blood Cells): 331 mL  Total IN: 3190.7 mL    OUT:    Intermittent Catheterization - Urethral (mL): 1000 mL  Total OUT: 1000 mL    Total NET: 2190.7 mL

## 2024-10-29 NOTE — DIETITIAN INITIAL EVALUATION ADULT - HEIGHT FOR BMI (FEET)
6
COPD (chronic obstructive pulmonary disease)  Bronchiectasis, recent PFT test was normal ( as per patient ), followed by Dr. Cheng  Deviated septum  septum perforation , spoke with ENT today , inconsequential , no issues  GERD (gastroesophageal reflux disease)  Moreno's esophagus  Hypercholesteremia    Hypertension    Stenosis of right carotid artery  severe

## 2024-10-30 NOTE — CHART NOTE - NSCHARTNOTEFT_GEN_A_CORE
This SW left a message for pts daughter Crow to follow up and provide support. Awaiting call back. Our team will continue to follow.
Had a meeting with the Family.      They would like to transition to full comfort care on 10/30 in the Morning
NIGHT ACP / HOSPITALIST     This is a 89 y/o M with pmhx of afib, Alzheimer's dementia, L hip fracture s/p ORIF, subdural hematoma 9/29/24, wo presented to  with fever, dyspnea, weakness, found to have acute hypoxic respiratory failure, COVID19 infection, CT chest imaging consistent with COVID19 pneumonia, No PE, and Hyponatremic from free water deficit. CT head redemonstrated subdural hematoma- neuro following. Called by RN for hypotension, hypothermia 94.3F and unable to swallow meds this evening SBP 70's.  Pt seen and evaluated at bedside. Pt weak and deconditioned, poor appetite with low PO intake.     PLAN:  *Manual BP - 86/68, 73/70  *½ NS 1L IVF x 1 given unable to swallow  *Hypothermia blanket ordered  *STAT Labs ordered   *Pt is full code - if no improvement - ICU consult needed  *Continue dexamethasone, day 6 today  *Continue empiric doxycycline and ceftriaxone, day 5 today, plan to complete 7-day course  *Continue O2 supplementation  RRT CALLED at 0650am: HYPOTENSION- SEE RRT NOTE    Gabby Browne, ANP-BC  Dept of Medicine

## 2024-10-30 NOTE — PROGRESS NOTE ADULT - RESPIRATORY
respiratory distress/use of accessory muscles

## 2024-10-30 NOTE — PROGRESS NOTE ADULT - CARDIOVASCULAR
regular rate and rhythm/S1 S2 present/tachycardia

## 2024-10-30 NOTE — PROGRESS NOTE ADULT - NUTRITIONAL ASSESSMENT
This patient has been assessed with a concern for Malnutrition and has been determined to have a diagnosis/diagnoses of Severe protein-calorie malnutrition.    This patient is being managed with:   Diet Regular-  Supplement Feeding Modality:  Oral  Ensure Plus High Protein Cans or Servings Per Day:  2       Frequency:  Two Times a day  Entered: Oct 27 2024  3:35PM  

## 2024-10-30 NOTE — DISCHARGE NOTE FOR THE EXPIRED PATIENT - SECONDARY DIAGNOSIS.
VICTORIA (acute kidney injury) Acute encephalopathy Septic shock 2019 novel coronavirus disease (COVID-19) Acute respiratory failure with hypoxia

## 2024-10-30 NOTE — PROGRESS NOTE ADULT - PROVIDER SPECIALTY LIST ADULT
Infectious Disease
Critical Care
Hospitalist
Infectious Disease
Palliative Care
Hospitalist
Infectious Disease
Infectious Disease
Cardiology
Hospitalist
Cardiology
Palliative Care
Cardiology
Critical Care

## 2024-10-30 NOTE — PROGRESS NOTE ADULT - SUBJECTIVE AND OBJECTIVE BOX
Chief Complaint: Fever, shortness of breath, generalized weakness and fatigue    Interval Hx: Patient seen and examined. Afebrile at this point and breathing rather comfortably at rest but still very weak and deconditioned, poor appetite with low PO intake. BP soft, 90s-110s systolic. Hypernatremic from free water deficit.     ROS: Multi system review is somewhat limited by patient's neurocognitive impairment, appears to be negative x 10 systems except as above    Vitals:  T(F): 97.3 (27 Oct 2024 02:10), Max: 97.5 (26 Oct 2024 16:32)  HR: 76 (27 Oct 2024 08:05) (76 - 87)  BP: 98/68 (27 Oct 2024 08:05) (91/42 - 103/70)  RR: 18 (27 Oct 2024 08:05) (18 - 18)  SpO2: 91% (27 Oct 2024 08:05) (90% - 95%) on O2 2-3L via NC    Exam:  GEN: NAD   HEENT: NCAT PERRL EOMI, somewhat dry oral mucosa  NECK: Soft and supple, no JVD  Chest: Normal resp effort at rest, +decreased breath sounds B/L  CVS: S1 S2+, irregular, rate 80  ABD: BS+, soft, NT/ND  EXT: No peripheral edema, 2+ peripheral pulses  SKIN: No rashes  NEURO: AAOx2-3, grossly non-focal     Labs:                        10.6   9.08  )---------( 266                   33.1       152  |  124  |  148  ----------------------< 171  3.9   |   19   |  2.18    Ca  8.8    TPro  5.7  /  Alb  2.0  /  TBili  0.5  /  DBili  0.1  /  AST  116  /  ALT  166  /  AlkPhos  81    PT: 20.4 sec;   INR: 1.74 ratio      Troponin negative    Procalcitonin 0.41  Lactate 1.8      Ferritin 1175  DDimer 2204    Micro:  COVID19 PCR 10/22: Detected  Flu, RSV PCR 10/22: Negative  Blood culture 10/22: Negative    Imaging:  CT head 10/23: Extra axial high attenuation again seen involving the left frontal parietal region. This finding measures approximately 1.0 cm in widest diameter and previously measured approximately 1.1 cm in widest diameter. This compatible with acute subdural hematoma. There is localized mass effect seen consisting of sulcal effacement. No significant shift or herniation seen. Evaluation of the osseous with the appropriate window appears normal Left maxillary bilateral ethmoid and left frontal sinus callosal thickening seen. Both mastoid and middle ear regions appear clear.    CTA chest PE protocol w/ IV cont 10/22: Few lymph nodes are present in the mediastinum. Heart is enlarged in size. Calcification of the aortic valve and the coronary arteries is noted. No pericardial effusion is noted. Pulmonary arteries are normal in caliber. No filling defects are noted. No endobronchial lesions are noted. Mucous/secretions are noted within few of the bronchi in both lower lobes. Emphysematous changes are present in both lungs. Patchy opacities are noted in the posterior segments of both upper lobes as well as within the peripheral aspect of the right middle and right lower lobes. No pleural effusions are noted. Extensive calcified pleural plaques are noted bilaterally. Moderate size hiatal hernia is noted. Below the diaphragm, visualized portions of the abdomen demonstrate patient sarmad status post cholecystectomy. Low-attenuation lesions within both kidneys are indeterminate based on this exam. Thickening/nodularity of both adrenal glands is noted. Degenerative changes of the spine are noted.    Cardiac Testing:  EKG 10/22: Rate 98. Atrial fibrillation with premature ventricular or aberrantly conducted complexes. Left axis deviation. Right bundle branch block    Prior visit cardiac testing  TTE 9/29/24: Left ventricular cavity is normal in size. Left ventricular wall thickness is normal. Left ventricular systolic function is normal with an ejection fraction visually estimated at 60 to 65 %. The left ventricular diastolic function is indeterminate. Mildly enlarged right ventricular cavity size. Left atrium is severely dilated. The right atrium is moderately dilated. Mild to moderate mitral regurgitation. Mild tricuspid regurgitation. Estimated pulmonary artery systolic pressure is 44 mmHg, consistent with mild pulmonary hypertension. Fibrocalcific aortic valve sclerosis without stenosis. Mild aortic regurgitation.    Meds:  MEDICATIONS  (STANDING):  acetaminophen     Tablet .. 975 milliGRAM(s) Oral every 8 hours  cefTRIAXone Injectable. 1000 milliGRAM(s) IV Push every 24 hours  chlorhexidine 4% Liquid 1 Application(s) Topical <User Schedule>  dexAMETHasone  Injectable 6 milliGRAM(s) IV Push daily  dextrose 5%. 1000 milliLiter(s) (75 mL/Hr) IV Continuous <Continuous>  doxycycline monohydrate Capsule 100 milliGRAM(s) Oral every 12 hours  enoxaparin Injectable 90 milliGRAM(s) SubCutaneous every 12 hours  finasteride 5 milliGRAM(s) Oral daily  influenza  Vaccine (HIGH DOSE) 0.5 milliLiter(s) IntraMuscular once  melatonin 3 milliGRAM(s) Oral at bedtime  metoprolol tartrate 50 milliGRAM(s) Oral two times a day  multivitamin 1 Tablet(s) Oral daily  tamsulosin 0.8 milliGRAM(s) Oral at bedtime    MEDICATIONS  (PRN):  acetaminophen     Tablet .. 650 milliGRAM(s) Oral every 4 hours PRN Temp greater or equal to 38.5C (101.3F)  albuterol    90 MICROgram(s) HFA Inhaler 2 Puff(s) Inhalation every 4 hours PRN Shortness of Breath and/or Wheezing  oxyCODONE    IR 5 milliGRAM(s) Oral every 8 hours PRN Severe Pain (7 - 10)  polyethylene glycol 3350 17 Gram(s) Oral at bedtime PRN Constipation  senna 2 Tablet(s) Oral at bedtime PRN Constipation  
Date of service: 10-27-24 @ 17:01    pt seen and examined   weak appearing  has cough, confused     ROS: unable to obtain d/t medical condition     MEDICATIONS  (STANDING):  acetaminophen     Tablet .. 975 milliGRAM(s) Oral every 8 hours  cefTRIAXone Injectable. 1000 milliGRAM(s) IV Push every 24 hours  chlorhexidine 4% Liquid 1 Application(s) Topical <User Schedule>  dexAMETHasone  Injectable 6 milliGRAM(s) IV Push daily  dextrose 5%. 1000 milliLiter(s) (75 mL/Hr) IV Continuous <Continuous>  doxycycline monohydrate Capsule 100 milliGRAM(s) Oral every 12 hours  enoxaparin Injectable 90 milliGRAM(s) SubCutaneous every 12 hours  finasteride 5 milliGRAM(s) Oral daily  influenza  Vaccine (HIGH DOSE) 0.5 milliLiter(s) IntraMuscular once  melatonin 3 milliGRAM(s) Oral at bedtime  metoprolol tartrate 50 milliGRAM(s) Oral two times a day  multivitamin 1 Tablet(s) Oral daily  tamsulosin 0.8 milliGRAM(s) Oral at bedtime  thiamine 100 milliGRAM(s) Oral at bedtime    Vital Signs Last 24 Hrs  T(C): 36.3 (27 Oct 2024 02:10), Max: 36.3 (27 Oct 2024 02:10)  T(F): 97.3 (27 Oct 2024 02:10), Max: 97.3 (27 Oct 2024 02:10)  HR: 76 (27 Oct 2024 08:05) (76 - 80)  BP: 98/68 (27 Oct 2024 08:05) (98/68 - 103/70)  BP(mean): --  RR: 18 (27 Oct 2024 08:05) (18 - 18)  SpO2: 91% (27 Oct 2024 08:05) (90% - 91%)    Parameters below as of 27 Oct 2024 08:05  Patient On (Oxygen Delivery Method): nasal cannula      PE:  Constitutional: NAD  HEENT: NC/AT, EOMI, PERRLA, conjunctivae clear; ears and nose atraumatic; pharynx benign  Neck: supple; thyroid not palpable  Back: no tenderness  Respiratory: decreased breath sounds    Cardiovascular: S1S2 regular, no murmurs  Abdomen: soft, not tender, not distended, positive BS; liver and spleen WNL  Genitourinary: no suprapubic tenderness  Lymphatic: no LN palpable  Musculoskeletal: no muscle tenderness, no joint swelling or tenderness  Extremities: no pedal edema  Neurological/ Psychiatric: moving all extremities  Skin: no rashes; no palpable lesions    Labs: all available labs reviewed                                      10.6 9.08  )-----------( 266      ( 26 Oct 2024 06:26 )             33.1     10-27    152[H]  |  124[H]  |  148[H]  ----------------------------<  171[H]  3.9   |  19[L]  |  2.18[H]    Ca    8.8      27 Oct 2024 06:15    TPro  5.7[L]  /  Alb  2.0[L]  /  TBili  0.5  /  DBili  0.1  /  AST  116[H]  /  ALT  166[H]  /  AlkPhos  81  10-27       LIVER FUNCTIONS - ( 23 Oct 2024 07:17 )  Alb: 2.2 g/dL / Pro: 6.8 gm/dL / ALK PHOS: 116 U/L / ALT: 72 U/L / AST: 97 U/L / GGT: x           Urinalysis Basic - ( 24 Oct 2024 06:42 )    Color: x / Appearance: x / SG: x / pH: x  Gluc: 140 mg/dL / Ketone: x  / Bili: x / Urobili: x   Blood: x / Protein: x / Nitrite: x   Leuk Esterase: x / RBC: x / WBC x   Sq Epi: x / Non Sq Epi: x / Bacteria: x    Culture - Blood (10.22.24 @ 18:19)   Specimen Source: .Blood BLOOD  Culture Results:   No growth at 24 hours  Culture - Blood (10.22.24 @ 18:19)   Specimen Source: .Blood BLOOD  Culture Results:   No growth at 24 hours    Radiology: all available radiological tests reviewed    IMPRESSION: No pulmonary embolus is noted.    Patchy opacities in the posterior segments of both upper lobes as well as   within the right middle and right lower lobes are noted as described   above. Exact etiology is unclear. One of the differential diagnostic   consideration includes dependent atelectasis.      Advanced directives addressed: full resuscitation
HOSPITALIST ATTENDING PROGRESS NOTE     Chart and meds reviewed. Patient seen and examined     Interval Hx/Events: Pt seen and evaluated. Weak appearing. Reports of cough. Currently, on supplemental oxgyen       All other systems and founds to be negative with exception of what has been described above.         PHYSICAL EXAM:  Vital Signs Last 24 Hrs  T(C): 36.3 (25 Oct 2024 15:38), Max: 36.3 (25 Oct 2024 15:38)  T(F): 97.3 (25 Oct 2024 15:38), Max: 97.3 (25 Oct 2024 15:38)  HR: 108 (25 Oct 2024 15:38) (103 - 116)  BP: 125/89 (25 Oct 2024 15:38) (125/89 - 131/85)  BP(mean): 97 (25 Oct 2024 09:00) (97 - 97)  RR: 19 (25 Oct 2024 15:38) (18 - 19)  SpO2: 93% (25 Oct 2024 15:38) (90% - 95%)    Parameters below as of 25 Oct 2024 15:38  Patient On (Oxygen Delivery Method): nasal cannula  O2 Flow (L/min): 3    Daily     Daily     GEN: NAD   HEENT: EOMI,  moist mucous membranes  NECK : Soft and supple, no JVD  LUNG: +decreased breath sounds   CVS: S1S2+, RRR, no M/G/R  GI: BS+, soft, NT/ND, no guarding, no rebound  EXTREMITIES: No peripheral edema  VASCULAR: 2+ peripheral pulses  NEURO: AAOx2-3, grossly non-focal   SKIN: No rashes    HOME MEDICATIONS:  Home Medications:  acetaminophen 10 mg/mL intravenous solution: 100 milliliter(s) intravenous once As needed Mild Pain (1 - 3) (02 Oct 2024 11:59)  acetaminophen 325 mg oral tablet: 3 tab(s) orally every 8 hours (02 Oct 2024 11:59)  finasteride 5 mg oral tablet: 1 tab(s) orally once a day (02 Oct 2024 11:59)  Lovenox 40 mg/0.4 mL injectable solution: 40 milligram(s) subcutaneously once a day DVT ppx post op (02 Oct 2024 11:59)  melatonin 3 mg oral tablet: 1 tab(s) orally once a day (at bedtime) (02 Oct 2024 11:59)  metoprolol tartrate 25 mg oral tablet: 1 tab(s) orally every 12 hours (02 Oct 2024 11:59)  Multiple Vitamins oral tablet: 1 tab(s) orally once a day (02 Oct 2024 11:59)  oxyCODONE 5 mg oral tablet: 1 tab(s) orally every 4 hours As needed Severe Pain (7 - 10) (02 Oct 2024 11:59)  polyethylene glycol 3350 oral powder for reconstitution: 17 gram(s) orally once a day (at bedtime) As needed Constipation (02 Oct 2024 11:59)  senna leaf extract oral tablet: 2 tab(s) orally once a day (at bedtime) As needed Constipation (02 Oct 2024 11:59)  tamsulosin 0.4 mg oral capsule: 2 cap(s) orally once a day (at bedtime) (02 Oct 2024 11:59)      MEDICATIONS  MEDICATIONS  (STANDING):  acetaminophen     Tablet .. 975 milliGRAM(s) Oral every 8 hours  cefTRIAXone Injectable. 1000 milliGRAM(s) IV Push every 24 hours  chlorhexidine 4% Liquid 1 Application(s) Topical <User Schedule>  dexAMETHasone  Injectable 6 milliGRAM(s) IV Push daily  doxycycline monohydrate Capsule 100 milliGRAM(s) Oral every 12 hours  enoxaparin Injectable 90 milliGRAM(s) SubCutaneous every 12 hours  finasteride 5 milliGRAM(s) Oral daily  influenza  Vaccine (HIGH DOSE) 0.5 milliLiter(s) IntraMuscular once  lactated ringers. 1000 milliLiter(s) (50 mL/Hr) IV Continuous <Continuous>  melatonin 3 milliGRAM(s) Oral at bedtime  metoprolol tartrate 50 milliGRAM(s) Oral two times a day  multivitamin 1 Tablet(s) Oral daily  remdesivir  IVPB 100 milliGRAM(s) IV Intermittent every 24 hours  remdesivir  IVPB   IV Intermittent   tamsulosin 0.8 milliGRAM(s) Oral at bedtime      LABS: All Labs Reviewed:                        11.9   9.92  )-----------( 212      ( 25 Oct 2024 07:17 )             37.0     10-25    145  |  118[H]  |  75[H]  ----------------------------<  133[H]  5.5[H]   |  22  |  1.26    Ca    8.8      25 Oct 2024 07:17  Phos  2.4     10-25  Mg     2.8     10-25    TPro  6.6  /  Alb  2.0[L]  /  TBili  0.6  /  DBili  0.1  /  AST  125[H]  /  ALT  109[H]  /  AlkPhos  97  10-25    PT/INR - ( 25 Oct 2024 07:17 )   PT: 15.2 sec;   INR: 1.29 ratio             Urinalysis Basic - ( 25 Oct 2024 07:17 )    Color: x / Appearance: x / SG: x / pH: x  Gluc: 133 mg/dL / Ketone: x  / Bili: x / Urobili: x   Blood: x / Protein: x / Nitrite: x   Leuk Esterase: x / RBC: x / WBC x   Sq Epi: x / Non Sq Epi: x / Bacteria: x        Blood Culture: 10-22 @ 18:19  Organism --  Gram Stain Blood -- Gram Stain --  Specimen Source .Blood BLOOD  Culture-Blood --      I&O's Detail    24 Oct 2024 07:01  -  25 Oct 2024 07:00  --------------------------------------------------------  IN:  Total IN: 0 mL    OUT:    Voided (mL): 300 mL  Total OUT: 300 mL    Total NET: -300 mL        CAPILLARY BLOOD GLUCOSE            CARDIOLOGY TESTING     EKG: reviewed     ECHO       RADIOLOGY: reviewed 
Date of service: 10-25-24 @ 12:10    pt seen and examined   weak appearing  has cough  hypoxic off o2    ROS: no fever or chills; denies dizziness, no HA, +SOB + cough, no abdominal pain, no diarrhea or constipation; no dysuria, no urinary frequency, no legs pain, no rashes    MEDICATIONS  (STANDING):  acetaminophen     Tablet .. 975 milliGRAM(s) Oral every 8 hours  cefTRIAXone Injectable. 1000 milliGRAM(s) IV Push every 24 hours  chlorhexidine 4% Liquid 1 Application(s) Topical <User Schedule>  dexAMETHasone  Injectable 6 milliGRAM(s) IV Push daily  doxycycline monohydrate Capsule 100 milliGRAM(s) Oral every 12 hours  enoxaparin Injectable 90 milliGRAM(s) SubCutaneous every 24 hours  finasteride 5 milliGRAM(s) Oral daily  influenza  Vaccine (HIGH DOSE) 0.5 milliLiter(s) IntraMuscular once  lactated ringers. 1000 milliLiter(s) (50 mL/Hr) IV Continuous <Continuous>  melatonin 3 milliGRAM(s) Oral at bedtime  metoprolol tartrate 25 milliGRAM(s) Oral two times a day  multivitamin 1 Tablet(s) Oral daily  remdesivir  IVPB   IV Intermittent   remdesivir  IVPB 100 milliGRAM(s) IV Intermittent every 24 hours  tamsulosin 0.8 milliGRAM(s) Oral at bedtime    Vital Signs Last 24 Hrs  T(C): 36.1 (25 Oct 2024 09:00), Max: 36.1 (25 Oct 2024 09:00)  T(F): 97 (25 Oct 2024 09:00), Max: 97 (25 Oct 2024 09:00)  HR: 116 (25 Oct 2024 09:00) (93 - 116)  BP: 131/85 (25 Oct 2024 09:00) (118/72 - 131/85)  BP(mean): 97 (25 Oct 2024 09:00) (97 - 97)  RR: 18 (25 Oct 2024 09:00) (18 - 18)  SpO2: 94% (25 Oct 2024 10:00) (90% - 95%)    Parameters below as of 25 Oct 2024 10:00    O2 Flow (L/min): 2    PE:  Constitutional: NAD  HEENT: NC/AT, EOMI, PERRLA, conjunctivae clear; ears and nose atraumatic; pharynx benign  Neck: supple; thyroid not palpable  Back: no tenderness  Respiratory: decreased breath sounds    Cardiovascular: S1S2 regular, no murmurs  Abdomen: soft, not tender, not distended, positive BS; liver and spleen WNL  Genitourinary: no suprapubic tenderness  Lymphatic: no LN palpable  Musculoskeletal: no muscle tenderness, no joint swelling or tenderness  Extremities: no pedal edema  Neurological/ Psychiatric: moving all extremities  Skin: no rashes; no palpable lesions    Labs: all available labs reviewed                                   11.9   9.92  )-----------( 212      ( 25 Oct 2024 07:17 )             37.0     10-25    145  |  118[H]  |  75[H]  ----------------------------<  133[H]  5.5[H]   |  22  |  1.26    Ca    8.8      25 Oct 2024 07:17  Phos  2.4     10-25  Mg     2.8     10-25    TPro  6.6  /  Alb  2.0[L]  /  TBili  0.6  /  DBili  0.1  /  AST  125[H]  /  ALT  109[H]  /  AlkPhos  97  10-25       LIVER FUNCTIONS - ( 23 Oct 2024 07:17 )  Alb: 2.2 g/dL / Pro: 6.8 gm/dL / ALK PHOS: 116 U/L / ALT: 72 U/L / AST: 97 U/L / GGT: x           Urinalysis Basic - ( 24 Oct 2024 06:42 )    Color: x / Appearance: x / SG: x / pH: x  Gluc: 140 mg/dL / Ketone: x  / Bili: x / Urobili: x   Blood: x / Protein: x / Nitrite: x   Leuk Esterase: x / RBC: x / WBC x   Sq Epi: x / Non Sq Epi: x / Bacteria: x    Culture - Blood (10.22.24 @ 18:19)   Specimen Source: .Blood BLOOD  Culture Results:   No growth at 24 hours  Culture - Blood (10.22.24 @ 18:19)   Specimen Source: .Blood BLOOD  Culture Results:   No growth at 24 hours    Radiology: all available radiological tests reviewed    IMPRESSION: No pulmonary embolus is noted.    Patchy opacities in the posterior segments of both upper lobes as well as   within the right middle and right lower lobes are noted as described   above. Exact etiology is unclear. One of the differential diagnostic   consideration includes dependent atelectasis.      Advanced directives addressed: full resuscitation
Date of service: 10-28-24 @ 13:25    pt seen and examined   events over 24 hrs noted  worsening resp failure tx to CCU placed on high flow NC  hypotensive requiring pressor support      ROS: unable to obtain d/t medical condition     MEDICATIONS  (STANDING):  acetaminophen     Tablet .. 975 milliGRAM(s) Oral every 8 hours  chlorhexidine 4% Liquid 1 Application(s) Topical <User Schedule>  dexAMETHasone  Injectable 6 milliGRAM(s) IV Push daily  dexMEDEtomidine Infusion 0.2 MICROgram(s)/kG/Hr (4.32 mL/Hr) IV Continuous <Continuous>  dextrose 5%. 1000 milliLiter(s) (75 mL/Hr) IV Continuous <Continuous>  doxycycline IVPB 100 milliGRAM(s) IV Intermittent every 12 hours  doxycycline IVPB      finasteride 5 milliGRAM(s) Oral daily  influenza  Vaccine (HIGH DOSE) 0.5 milliLiter(s) IntraMuscular once  melatonin 3 milliGRAM(s) Oral at bedtime  multivitamin 1 Tablet(s) Oral daily  phenylephrine    Infusion 0.1 MICROgram(s)/kG/Min (3.24 mL/Hr) IV Continuous <Continuous>  piperacillin/tazobactam IVPB.. 3.375 Gram(s) IV Intermittent every 8 hours  tamsulosin 0.8 milliGRAM(s) Oral at bedtime  thiamine 100 milliGRAM(s) Oral at bedtime      Vital Signs Last 24 Hrs  T(C): 36.4 (28 Oct 2024 12:00), Max: 37.2 (28 Oct 2024 08:00)  T(F): 97.5 (28 Oct 2024 12:00), Max: 99 (28 Oct 2024 08:00)  HR: 103 (28 Oct 2024 13:10) (61 - 141)  BP: 66/35 (28 Oct 2024 13:10) (66/35 - 133/94)  BP(mean): 46 (28 Oct 2024 13:10) (46 - 93)  RR: 13 (28 Oct 2024 13:10) (13 - 39)  SpO2: 96% (28 Oct 2024 13:10) (77% - 99%)    Parameters below as of 28 Oct 2024 12:00  Patient On (Oxygen Delivery Method): nasal cannula, high flow  O2 Flow (L/min): 50  O2 Concentration (%): 70      PE:  Constitutional: NAD  HEENT: NC/AT, EOMI, PERRLA, conjunctivae clear; ears and nose atraumatic; pharynx benign  Neck: supple; thyroid not palpable  Back: no tenderness  Respiratory: decreased breath sounds, rhonchi   Cardiovascular: S1S2 regular, no murmurs  Abdomen: soft, not tender, not distended, positive BS; liver and spleen WNL  Genitourinary: no suprapubic tenderness  Lymphatic: no LN palpable  Musculoskeletal: no muscle tenderness, no joint swelling or tenderness  Extremities: no pedal edema  Neurological/ Psychiatric: confused  Skin: no rashes; no palpable lesions    Labs: all available labs reviewed                                   6.6    25.30 )-----------( 303      ( 28 Oct 2024 06:16 )             19.8     10-28    x   |  x   |  x   ----------------------------<  x   x    |  x   |  2.78[H]    Ca    8.2[L]      28 Oct 2024 03:51  Phos  3.2     10-28  Mg     2.5     10-28    TPro  5.2[L]  /  Alb  2.0[L]  /  TBili  0.5  /  DBili  0.2  /  AST  59[H]  /  ALT  128[H]  /  AlkPhos  78  10-28        LIVER FUNCTIONS - ( 23 Oct 2024 07:17 )  Alb: 2.2 g/dL / Pro: 6.8 gm/dL / ALK PHOS: 116 U/L / ALT: 72 U/L / AST: 97 U/L / GGT: x           Urinalysis Basic - ( 24 Oct 2024 06:42 )    Color: x / Appearance: x / SG: x / pH: x  Gluc: 140 mg/dL / Ketone: x  / Bili: x / Urobili: x   Blood: x / Protein: x / Nitrite: x   Leuk Esterase: x / RBC: x / WBC x   Sq Epi: x / Non Sq Epi: x / Bacteria: x    Culture - Blood (10.22.24 @ 18:19)   Specimen Source: .Blood BLOOD  Culture Results:   No growth at 24 hours  Culture - Blood (10.22.24 @ 18:19)   Specimen Source: .Blood BLOOD  Culture Results:   No growth at 24 hours    Radiology: all available radiological tests reviewed    IMPRESSION: No pulmonary embolus is noted.    Patchy opacities in the posterior segments of both upper lobes as well as   within the right middle and right lower lobes are noted as described   above. Exact etiology is unclear. One of the differential diagnostic   consideration includes dependent atelectasis.      Advanced directives addressed: DNR/DNI
HOSPITALIST ATTENDING PROGRESS NOTE     Chart and meds reviewed. Patient seen and examined       Interval Hx/Events   Pt is a 89yom seen and examined this morning. Pt has no acute complaints at this time. Denies CP, SOB, abd pain, n/v/d, cough, calf pain, tremors, fever/chills, fatigue, malaise    All other systems and founds to be negative with exception of what has been described above.         PHYSICAL EXAM:  Vital Signs Last 24 Hrs  T(C): 36.4 (24 Oct 2024 07:37), Max: 37.4 (23 Oct 2024 21:10)  T(F): 97.5 (24 Oct 2024 07:37), Max: 99.3 (23 Oct 2024 21:10)  HR: 104 (24 Oct 2024 07:37) (94 - 104)  BP: 117/64 (24 Oct 2024 07:37) (98/70 - 140/75)  BP(mean): --  RR: 18 (24 Oct 2024 07:37) (18 - 18)  SpO2: 94% (24 Oct 2024 07:37) (94% - 95%)    Parameters below as of 24 Oct 2024 07:37  Patient On (Oxygen Delivery Method): nasal cannula      Daily     Daily     GEN: NAD  HEENT: EOMI,  moist mucous membranes, PERRLA  NECK : Soft and supple, no JVD  LUNG: CTABL, No wheezing, rales or rhonchi  CVS: S1S2+, irregular rate and rhythm no M/G/R  GI: BS+, soft, NT/ND, no guarding, no rebound  EXTREMITIES: No peripheral edema  VASCULAR: 2+ peripheral pulses  NEURO: AAOx2, grossly non-focal, no drift, no facial drooping, no slurred speech, no obvious neuro deficits  MSK: Strength 5/5 b/l UE and LE. Mild tenderness upon active movmt of R leg at the hip  SKIN: No rashes    HOME MEDICATIONS:  Home Medications:  acetaminophen 10 mg/mL intravenous solution: 100 milliliter(s) intravenous once As needed Mild Pain (1 - 3) (02 Oct 2024 11:59)  acetaminophen 325 mg oral tablet: 3 tab(s) orally every 8 hours (02 Oct 2024 11:59)  finasteride 5 mg oral tablet: 1 tab(s) orally once a day (02 Oct 2024 11:59)  Lovenox 40 mg/0.4 mL injectable solution: 40 milligram(s) subcutaneously once a day DVT ppx post op (02 Oct 2024 11:59)  melatonin 3 mg oral tablet: 1 tab(s) orally once a day (at bedtime) (02 Oct 2024 11:59)  metoprolol tartrate 25 mg oral tablet: 1 tab(s) orally every 12 hours (02 Oct 2024 11:59)  Multiple Vitamins oral tablet: 1 tab(s) orally once a day (02 Oct 2024 11:59)  oxyCODONE 5 mg oral tablet: 1 tab(s) orally every 4 hours As needed Severe Pain (7 - 10) (02 Oct 2024 11:59)  polyethylene glycol 3350 oral powder for reconstitution: 17 gram(s) orally once a day (at bedtime) As needed Constipation (02 Oct 2024 11:59)  senna leaf extract oral tablet: 2 tab(s) orally once a day (at bedtime) As needed Constipation (02 Oct 2024 11:59)  tamsulosin 0.4 mg oral capsule: 2 cap(s) orally once a day (at bedtime) (02 Oct 2024 11:59)      MEDICATIONS  MEDICATIONS  (STANDING):  acetaminophen     Tablet .. 975 milliGRAM(s) Oral every 8 hours  cefTRIAXone Injectable. 1000 milliGRAM(s) IV Push every 24 hours  chlorhexidine 4% Liquid 1 Application(s) Topical <User Schedule>  dexAMETHasone  Injectable 6 milliGRAM(s) IV Push daily  doxycycline monohydrate Capsule 100 milliGRAM(s) Oral every 12 hours  enoxaparin Injectable 90 milliGRAM(s) SubCutaneous every 12 hours  finasteride 5 milliGRAM(s) Oral daily  influenza  Vaccine (HIGH DOSE) 0.5 milliLiter(s) IntraMuscular once  lactated ringers. 1000 milliLiter(s) (50 mL/Hr) IV Continuous <Continuous>  melatonin 3 milliGRAM(s) Oral at bedtime  metoprolol tartrate 25 milliGRAM(s) Oral two times a day  multivitamin 1 Tablet(s) Oral daily  remdesivir  IVPB 100 milliGRAM(s) IV Intermittent every 24 hours  remdesivir  IVPB   IV Intermittent   tamsulosin 0.8 milliGRAM(s) Oral at bedtime      LABS: All Labs Reviewed:                        12.8   11.00 )-----------( 256      ( 24 Oct 2024 06:42 )             40.1     10-24    143  |  113[H]  |  47[H]  ----------------------------<  140[H]  4.4   |  22  |  1.73[H]    Ca    9.0      24 Oct 2024 06:42  Phos  3.7     10-23  Mg     2.6     10-23    TPro  x   /  Alb  x   /  TBili  x   /  DBili  0.3  /  AST  x   /  ALT  x   /  AlkPhos  x   10-24    PT/INR - ( 24 Oct 2024 06:42 )   PT: 17.5 sec;   INR: 1.53 ratio         PTT - ( 22 Oct 2024 18:19 )  PTT:33.0 sec    Urinalysis Basic - ( 24 Oct 2024 06:42 )    Color: x / Appearance: x / SG: x / pH: x  Gluc: 140 mg/dL / Ketone: x  / Bili: x / Urobili: x   Blood: x / Protein: x / Nitrite: x   Leuk Esterase: x / RBC: x / WBC x   Sq Epi: x / Non Sq Epi: x / Bacteria: x        Culture - Blood (collected 22 Oct 2024 18:19)  Source: .Blood BLOOD  Preliminary Report (24 Oct 2024 01:02):    No growth at 24 hours    Culture - Blood (collected 22 Oct 2024 18:19)  Source: .Blood BLOOD  Preliminary Report (24 Oct 2024 01:02):    No growth at 24 hours      Blood Culture: 10-22 @ 18:19  Organism --  Gram Stain Blood -- Gram Stain --  Specimen Source .Blood BLOOD  Culture-Blood --      I&O's Detail    23 Oct 2024 07:01  -  24 Oct 2024 07:00  --------------------------------------------------------  IN:  Total IN: 0 mL    OUT:    Voided (mL): 300 mL  Total OUT: 300 mL    Total NET: -300 mL      24 Oct 2024 07:01  -  24 Oct 2024 13:51  --------------------------------------------------------  IN:  Total IN: 0 mL    OUT:    Voided (mL): 100 mL  Total OUT: 100 mL    Total NET: -100 mL        CAPILLARY BLOOD GLUCOSE            CARDIOLOGY TESTING         EKG   < from: 12 Lead ECG (10.22.24 @ 17:28) >  Ventricular Rate 98 BPM    Atrial Rate 59 BPM    QRS Duration 158 ms    Q-T Interval 396 ms    QTC Calculation(Bazett) 505 ms    R Axis -67 degrees    T Axis 34 degrees    Diagnosis Line Atrial fibrillation with premature ventricular or aberrantly conducted complexes  Left axis deviation  Right bundle branch block  Abnormal ECG  ECHO       RADIOLOGY  < from: CT Head No Cont (10.23.24 @ 10:10) >    INTERPRETATION:  CLINICAL INFORMATION: follow up SDH    COMPARISON: Head CT September 29, 2024.    Multiple axial sections were performed from the base skull to vertex   without contrast. Coronal and sagittal reconstructions were performed    Extra axial high attenuation again seen involving the left frontal   parietal region. This finding measures approximately 1.0 cm in widest   diameter and previously measured approximately 1.1 cm in widest diameter.   This compatible with acute subdural hematoma. There is localized mass   effect seen consisting of sulcal effacement. No significant shift or   herniation seen.    Evaluation of the osseous with the appropriate window appears normal    Left maxillary bilateral ethmoid and left frontal sinus sinus callosal   thickening seen    Both mastoid and middle ear regions appear clear.    Impression: Acute subdural hematoma again seen as described above.    --- End of Report ---  < from: CT Angio Chest PE Protocol w/ IV Cont (10.22.24 @ 23:05) >    INTERPRETATION:  Clinical information: Elevated d-dimer levels. Exam is   compared to previous study of 9/29/2024.    CT angiogram of the chest was obtained following administration of   intravenous contrast. Approximately 70 cc of Omnipaque 350 was   administered. Coronal, sagittal and MIP images were submitted for review.    Few lymph nodes are present in the mediastinum.    Heart is enlarged in size. Calcification of the aortic valve and the   coronary arteries is noted. No pericardial effusion is noted. Pulmonary   arteries are normal in caliber. No filling defects are noted.    No endobronchial lesions are noted. Mucous/secretions are noted within   few of the bronchi in both lower lobes. Emphysematous changes are present   in both lungs. Patchy opacities are noted in the posterior segments of   both upper lobes as well as within the peripheral aspect of the right   middle and right lower lobes. No pleural effusions are noted. Extensive   calcified pleural plaques are noted bilaterally.    Moderate size hiatal hernia is noted.    Below the diaphragm, visualized portions of the abdomen demonstrate   patient sarmad status post cholecystectomy. Low-attenuation lesions within   both kidneys are indeterminate based on this exam. Thickening/nodularity   of both adrenal glands is noted.    Degenerative changes of the spine are noted.    IMPRESSION: No pulmonary embolus is noted.    Patchy opacities in the posterior segments of both upper lobes as well as   within the right middle and right lower lobes are noted as described   above. Exact etiology is unclear. One of the differential diagnostic   considerationincludes dependent atelectasis.    --- End of Report ---                
HPI:  CHIEF COMPLAINT: Patient is a 89y old  Male who presents with a chief complaint of AHRF & Afib w/ RVR (23 Oct 2024 12:11)    HPI:  89 year old male w recent dx AFIB, Alzheimer dementia,  recent L SDH and L hip fx ORIF 09-, BPH urinary retention w gallegos  BIBEMS for weakness and fatigue  Recent adm 09-29 to 10- adm to ICU s/p fall from standing on to carpeted floor sustaining L hip fx and L SDH.    DC to [facility] for rehab on lovenox 40 mg qd x 4 weeks  [facility]   since yesterday:  +fever +SOB +weakness.  History obtained from family at bedside when they were in ED   At time of my interview and exam, pt was alone  I was contacted by ED nurse that d-dimer was 1900; I ordered CTA  In ED /91   HR 94   RR 17   T 97.5   93% sat RA  AFIB w , O2 89% RA,  96% with 2 L nc   NS 1000 cc  CXR neg , remdesivir, decadron  Being admitted to our service for AHRF d/t  COVID-19, AFIB w RVR    Cardiology consulted for new Afib and advice regarding starting DOAC in apt. with Hx of ICH, recurrent Falls and dementia.     10/25/24 - no cardiac complaints, Tele: Afib  - BB dose increased   10/26/'24: no complaints Afib 70-110s leads on pt's back poor signals    MEDICATIONS:  OUTPATIENT  Home Medications:  acetaminophen 10 mg/mL intravenous solution: 100 milliliter(s) intravenous once As needed Mild Pain (1 - 3) (02 Oct 2024 11:59)  acetaminophen 325 mg oral tablet: 3 tab(s) orally every 8 hours (02 Oct 2024 11:59)  finasteride 5 mg oral tablet: 1 tab(s) orally once a day (02 Oct 2024 11:59)  Lovenox 40 mg/0.4 mL injectable solution: 40 milligram(s) subcutaneously once a day DVT ppx post op (02 Oct 2024 11:59)  melatonin 3 mg oral tablet: 1 tab(s) orally once a day (at bedtime) (02 Oct 2024 11:59)  metoprolol tartrate 25 mg oral tablet: 1 tab(s) orally every 12 hours (02 Oct 2024 11:59)  Multiple Vitamins oral tablet: 1 tab(s) orally once a day (02 Oct 2024 11:59)  oxyCODONE 5 mg oral tablet: 1 tab(s) orally every 4 hours As needed Severe Pain (7 - 10) (02 Oct 2024 11:59)  polyethylene glycol 3350 oral powder for reconstitution: 17 gram(s) orally once a day (at bedtime) As needed Constipation (02 Oct 2024 11:59)  senna leaf extract oral tablet: 2 tab(s) orally once a day (at bedtime) As needed Constipation (02 Oct 2024 11:59)  tamsulosin 0.4 mg oral capsule: 2 cap(s) orally once a day (at bedtime) (02 Oct 2024 11:59)      INPATIENT  MEDICATIONS  (STANDING):  acetaminophen     Tablet .. 975 milliGRAM(s) Oral every 8 hours  cefTRIAXone Injectable. 1000 milliGRAM(s) IV Push every 24 hours  chlorhexidine 4% Liquid 1 Application(s) Topical <User Schedule>  dexAMETHasone  Injectable 6 milliGRAM(s) IV Push daily  dextrose 5%. 1000 milliLiter(s) (75 mL/Hr) IV Continuous <Continuous>  doxycycline monohydrate Capsule 100 milliGRAM(s) Oral every 12 hours  enoxaparin Injectable 90 milliGRAM(s) SubCutaneous every 12 hours  finasteride 5 milliGRAM(s) Oral daily  influenza  Vaccine (HIGH DOSE) 0.5 milliLiter(s) IntraMuscular once  melatonin 3 milliGRAM(s) Oral at bedtime  metoprolol tartrate 50 milliGRAM(s) Oral two times a day  multivitamin 1 Tablet(s) Oral daily  remdesivir  IVPB   IV Intermittent   remdesivir  IVPB 100 milliGRAM(s) IV Intermittent every 24 hours  tamsulosin 0.8 milliGRAM(s) Oral at bedtime    MEDICATIONS  (PRN):  acetaminophen     Tablet .. 650 milliGRAM(s) Oral every 4 hours PRN Temp greater or equal to 38.5C (101.3F)  albuterol    90 MICROgram(s) HFA Inhaler 2 Puff(s) Inhalation every 4 hours PRN Shortness of Breath and/or Wheezing  oxyCODONE    IR 5 milliGRAM(s) Oral every 8 hours PRN Severe Pain (7 - 10)  polyethylene glycol 3350 17 Gram(s) Oral at bedtime PRN Constipation  senna 2 Tablet(s) Oral at bedtime PRN Constipation          Vital Signs Last 24 Hrs  T(C): 36.4 (26 Oct 2024 16:32), Max: 36.4 (25 Oct 2024 20:45)  T(F): 97.5 (26 Oct 2024 16:32), Max: 97.5 (25 Oct 2024 20:45)  HR: 87 (26 Oct 2024 16:32) (87 - 102)  BP: 91/42 (26 Oct 2024 16:32) (90/63 - 104/-)  BP(mean): --  RR: 18 (26 Oct 2024 16:32) (16 - 18)  SpO2: 95% (26 Oct 2024 16:32) (95% - 95%)    Parameters below as of 26 Oct 2024 16:32  Patient On (Oxygen Delivery Method): nasal cannula    Daily     Daily I&O's Summary    25 Oct 2024 07:01  -  26 Oct 2024 07:00  --------------------------------------------------------  IN: 780 mL / OUT: 356 mL / NET: 424 mL        I&O's Detail    25 Oct 2024 07:01  -  26 Oct 2024 07:00  --------------------------------------------------------  IN:    Oral Fluid: 780 mL  Total IN: 780 mL    OUT:    Voided (mL): 356 mL  Total OUT: 356 mL    Total NET: 424 mL          I&O's Summary    25 Oct 2024 07:01  -  26 Oct 2024 07:00  --------------------------------------------------------  IN: 780 mL / OUT: 356 mL / NET: 424 mL        PHYSICAL EXAM:    Constitutional: NAD, awake  HEENT:  No oral cyanosis.  Pulmonary: Non-labored, breath sounds are clear bilaterally  Cardiovascular: S1 and S2, irregular, normal rate  Gastrointestinal: Bowel Sounds present, soft, nontender.   Lymph: No edema. No cervical lymphadenopathy.  Neurological: Moves all extremities, interactive, no focal deficits  Skin: No rashes.  Psych:  Mood & affect appropriate      ===============================  ===============================  LABS:                         10.6   9.08  )-----------( 266      ( 26 Oct 2024 06:26 )             33.1                         11.9   9.92  )-----------( 212      ( 25 Oct 2024 07:17 )             37.0                         12.8   11.00 )-----------( 256      ( 24 Oct 2024 06:42 )             40.1     26 Oct 2024 06:26    152    |  122    |  96     ----------------------------<  139    3.8     |  24     |  1.50   25 Oct 2024 07:17    145    |  118    |  75     ----------------------------<  133    5.5     |  22     |  1.26   24 Oct 2024 06:42    143    |  113    |  47     ----------------------------<  140    4.4     |  22     |  1.73     Ca    9.0        26 Oct 2024 06:26  Ca    8.8        25 Oct 2024 07:17  Ca    9.0        24 Oct 2024 06:42  Phos  2.4       25 Oct 2024 07:17  Mg     2.8       25 Oct 2024 07:17    TPro  6.3    /  Alb  2.1    /  TBili  0.6    /  DBili  0.3    /  AST  129    /  ALT  142    /  AlkPhos  90     26 Oct 2024 06:26  TPro  6.6    /  Alb  2.0    /  TBili  0.6    /  DBili  0.1    /  AST  125    /  ALT  109    /  AlkPhos  97     25 Oct 2024 07:17  TPro  x      /  Alb  x      /  TBili  x      /  DBili  0.3    /  AST  x      /  ALT  x      /  AlkPhos  x      24 Oct 2024 06:42    PT/INR - ( 26 Oct 2024 06:26 )   PT: 18.0 sec;   INR: 1.57 ratio           ===============================  ===============================  CARDIAC BIOMARKERS:  BNP      TROPONIN  Troponin I, High Sensitivity Result: 15.56 ng/L (10-23-24 @ 07:17)  Troponin I, High Sensitivity Result: 21.16 ng/L (10-22-24 @ 17:24)  Troponin I, High Sensitivity Result: 10.66 ng/L (09-29-24 @ 00:08)    ===============================  ===============================  Radiology?EKG/TTE: reviewed     TTE W or WO Ultrasound Enhancing Agent (09.29.24 @ 10:06) >   1. Left ventricular cavity is normal in size. Left ventricular wall thickness is normal. Left ventricular systolic function is normal with an ejection fraction visually estimated at 60 to 65 %.   2. The left ventricular diastolic function is indeterminate.   3. Mildly enlarged right ventricular cavity size.   4. Left atrium is severely dilated.   5. The right atrium is moderately dilated.   6. Mild to moderate mitral regurgitation.   7. Mild tricuspid regurgitation.   8. Estimated pulmonary artery systolic pressure is 44 mmHg, consistent with mild pulmonary hypertension.   9. Fibrocalcific aortic valve sclerosis without stenosis.  10. Mild aortic regurgitation.    12 Lead ECG (10.22.24 @ 17:28) >  Atrial fibrillation with premature ventricular or aberrantly conducted complexes  Left axis deviation  Right bundle branch block  Abnormal ECG      
  Date of service: 10-26-24 @ 15:14    pt seen and examined   weak appearing  has cough  worsening resp failure   on 5L NC     ROS: unable to obtain d/t medical condition     MEDICATIONS  (STANDING):  acetaminophen     Tablet .. 975 milliGRAM(s) Oral every 8 hours  cefTRIAXone Injectable. 1000 milliGRAM(s) IV Push every 24 hours  chlorhexidine 4% Liquid 1 Application(s) Topical <User Schedule>  dexAMETHasone  Injectable 6 milliGRAM(s) IV Push daily  doxycycline monohydrate Capsule 100 milliGRAM(s) Oral every 12 hours  enoxaparin Injectable 90 milliGRAM(s) SubCutaneous every 12 hours  finasteride 5 milliGRAM(s) Oral daily  influenza  Vaccine (HIGH DOSE) 0.5 milliLiter(s) IntraMuscular once  lactated ringers. 1000 milliLiter(s) (50 mL/Hr) IV Continuous <Continuous>  melatonin 3 milliGRAM(s) Oral at bedtime  metoprolol tartrate 50 milliGRAM(s) Oral two times a day  multivitamin 1 Tablet(s) Oral daily  remdesivir  IVPB   IV Intermittent   remdesivir  IVPB 100 milliGRAM(s) IV Intermittent every 24 hours  tamsulosin 0.8 milliGRAM(s) Oral at bedtime    Vital Signs Last 24 Hrs  T(C): 36.2 (26 Oct 2024 08:07), Max: 36.4 (25 Oct 2024 20:45)  T(F): 97.2 (26 Oct 2024 08:07), Max: 97.5 (25 Oct 2024 20:45)  HR: 90 (26 Oct 2024 08:07) (90 - 108)  BP: 90/63 (26 Oct 2024 08:07) (90/63 - 125/89)  BP(mean): --  RR: 18 (26 Oct 2024 08:07) (16 - 19)  SpO2: 95% (26 Oct 2024 08:07) (93% - 95%)    Parameters below as of 26 Oct 2024 08:07  Patient On (Oxygen Delivery Method): nasal cannula  O2 Flow (L/min): 5      PE:  Constitutional: NAD  HEENT: NC/AT, EOMI, PERRLA, conjunctivae clear; ears and nose atraumatic; pharynx benign  Neck: supple; thyroid not palpable  Back: no tenderness  Respiratory: decreased breath sounds    Cardiovascular: S1S2 regular, no murmurs  Abdomen: soft, not tender, not distended, positive BS; liver and spleen WNL  Genitourinary: no suprapubic tenderness  Lymphatic: no LN palpable  Musculoskeletal: no muscle tenderness, no joint swelling or tenderness  Extremities: no pedal edema  Neurological/ Psychiatric: moving all extremities  Skin: no rashes; no palpable lesions    Labs: all available labs reviewed                                              10.6   9.08  )-----------( 266      ( 26 Oct 2024 06:26 )             33.1     10-26    152[H]  |  122[H]  |  96[H]  ----------------------------<  139[H]  3.8   |  24  |  1.50[H]    Ca    9.0      26 Oct 2024 06:26  Phos  2.4     10-25  Mg     2.8     10-25    TPro  6.3  /  Alb  2.1[L]  /  TBili  0.6  /  DBili  0.3  /  AST  129[H]  /  ALT  142[H]  /  AlkPhos  90  10-26       LIVER FUNCTIONS - ( 23 Oct 2024 07:17 )  Alb: 2.2 g/dL / Pro: 6.8 gm/dL / ALK PHOS: 116 U/L / ALT: 72 U/L / AST: 97 U/L / GGT: x           Urinalysis Basic - ( 24 Oct 2024 06:42 )    Color: x / Appearance: x / SG: x / pH: x  Gluc: 140 mg/dL / Ketone: x  / Bili: x / Urobili: x   Blood: x / Protein: x / Nitrite: x   Leuk Esterase: x / RBC: x / WBC x   Sq Epi: x / Non Sq Epi: x / Bacteria: x    Culture - Blood (10.22.24 @ 18:19)   Specimen Source: .Blood BLOOD  Culture Results:   No growth at 24 hours  Culture - Blood (10.22.24 @ 18:19)   Specimen Source: .Blood BLOOD  Culture Results:   No growth at 24 hours    Radiology: all available radiological tests reviewed    IMPRESSION: No pulmonary embolus is noted.    Patchy opacities in the posterior segments of both upper lobes as well as   within the right middle and right lower lobes are noted as described   above. Exact etiology is unclear. One of the differential diagnostic   consideration includes dependent atelectasis.      Advanced directives addressed: full resuscitation
  HPI:   89yom, poor historian, admitted for AHRF and Afib w/ RVR, pmhx of dementia and s/p ORIF for hip fx on 09/29/24 and SDH after a fall at home examined and seen this morning. Pt. states no acute complaints at this time. Spoke with daughter on the phone regarding advanced directives. Daughter states pt. has not had a BM since friday. Denies SOB, cough, fever, aches, fatigue, CP, n/v/d, abd. pain, palpitations, dizziness.    CODE STATUS : fulll CODE    10/28   PT was seen last week sp consutl.   was more alert and talkative, no complaints but w/ hx of underlying dementia  daughter is his decision maker   after conversations with her last week she was aware of risk/benefits of full code status       This morning pt had a rapid response- which was appropriate to his current level of care, as per nursing he was more lethargic and less interactive.   Over the weekend pt was able to tell staff stories of his life and as of sunday was more withdrawn and somnolent    This morning became hypoxic and even noted to have some hypotension.   He was seen now in the CCU on hiflow with tenuous BP  that had stabalized at 90s/60s CCU aware and plan of care was to start possible pressors as per CCU PA    This morning pts family was called by CCU PA and made clear patient is endstage of his disease and dying.  Our recocmendation too is COMFORT ONLY         Will reach out to family today as well.       PAIN: ( ) YES  ( X)  NO  Pt unable to characterise d/t clinical status     DYSPNEA ( ) Yes ( X) No  Patient unable to characterise d/t clinical status     PAST MEDICAL & SURGICAL HISTORY:  Dementia      Hard of hearing          SOCIAL HX:    Hx opiate tolerance ( )YES  (x )NO    Baseline ADLs  (Prior to Admission)  ( x) Independent   (x )Dependent    FAMILY HISTORY:    Review of Systems:    Anxiety- denies  Depression-denies  Physical Discomfort- in NAD  Dyspnea-denies  Constipation-denies  Diarrhea-denies  Nausea-denies  Vomiting-denies  Anorexia-denies  Weight Loss- denies  Cough-+  Secretions-denies  Fatigue-+  Weakness-+  Delirium-+    All other systems reviewed and negative     PHYSICAL EXAM:    ICU Vital Signs Last 24 Hrs  T(C): 37.2 (28 Oct 2024 08:00), Max: 37.2 (28 Oct 2024 08:00)  T(F): 99 (28 Oct 2024 08:00), Max: 99 (28 Oct 2024 08:00)  HR: 141 (28 Oct 2024 09:00) (61 - 141)  BP: 91/69 (28 Oct 2024 09:00) (70/34 - 133/94)  BP(mean): 78 (28 Oct 2024 09:00) (66 - 78)  ABP: --  ABP(mean): --  RR: 38 (28 Oct 2024 09:00) (18 - 39)  SpO2: 95% (28 Oct 2024 09:00) (90% - 98%)    O2 Parameters below as of 28 Oct 2024 08:00  Patient On (Oxygen Delivery Method): nasal cannula, high flow  O2 Flow (L/min): 50  O2 Concentration (%): 70      PPSV2:   40%  FAST:    General: calm in NAD  Mental Status: somnolent   HEENT: eomi, perrl  Lungs: decreased b/l bs  on hiflow  Cardiac: s1s2 no mgr  GI: soft nontender +BS  : voids  Ext: moves all 4 extremities spontaneously  Neuro: no gross findings     LABS:                        12.8   11.00 )-----------( 256      ( 24 Oct 2024 06:42 )             40.1     10-24    143  |  113[H]  |  47[H]  ----------------------------<  140[H]  4.4   |  22  |  1.73[H]    Ca    9.0      24 Oct 2024 06:42  Phos  3.7     10-23  Mg     2.6     10-23    TPro  x   /  Alb  x   /  TBili  x   /  DBili  0.3  /  AST  x   /  ALT  x   /  AlkPhos  x   10-24    PT/INR - ( 24 Oct 2024 06:42 )   PT: 17.5 sec;   INR: 1.53 ratio         PTT - ( 22 Oct 2024 18:19 )  PTT:33.0 sec  Albumin: Albumin: 2.2 g/dL (10-23 @ 07:17)      Allergies    No Known Allergies    Intolerances      MEDICATIONS  (STANDING):  MEDICATIONS  (STANDING):  acetaminophen     Tablet .. 975 milliGRAM(s) Oral every 8 hours  chlorhexidine 4% Liquid 1 Application(s) Topical <User Schedule>  dexAMETHasone  Injectable 6 milliGRAM(s) IV Push daily  dextrose 5%. 1000 milliLiter(s) (75 mL/Hr) IV Continuous <Continuous>  doxycycline IVPB 100 milliGRAM(s) IV Intermittent every 12 hours  doxycycline IVPB      doxycycline IVPB 100 milliGRAM(s) IV Intermittent once  finasteride 5 milliGRAM(s) Oral daily  influenza  Vaccine (HIGH DOSE) 0.5 milliLiter(s) IntraMuscular once  melatonin 3 milliGRAM(s) Oral at bedtime  multivitamin 1 Tablet(s) Oral daily  phenylephrine    Infusion 0.1 MICROgram(s)/kG/Min (3.24 mL/Hr) IV Continuous <Continuous>  piperacillin/tazobactam IVPB. 3.375 Gram(s) IV Intermittent once  piperacillin/tazobactam IVPB.. 3.375 Gram(s) IV Intermittent every 8 hours  sodium chloride 0.45%. 1000 milliLiter(s) (1000 mL/Hr) IV Continuous <Continuous>  tamsulosin 0.8 milliGRAM(s) Oral at bedtime  thiamine 100 milliGRAM(s) Oral at bedtime    MEDICATIONS  (PRN):  acetaminophen     Tablet .. 650 milliGRAM(s) Oral every 4 hours PRN Temp greater or equal to 38.5C (101.3F)  albuterol    90 MICROgram(s) HFA Inhaler 2 Puff(s) Inhalation every 4 hours PRN Shortness of Breath and/or Wheezing  oxyCODONE    IR 5 milliGRAM(s) Oral every 8 hours PRN Severe Pain (7 - 10)  polyethylene glycol 3350 17 Gram(s) Oral at bedtime PRN Constipation  senna 2 Tablet(s) Oral at bedtime PRN Constipation      RADIOLOGY/ADDITIONAL STUDIES:
  HPI:   89yom, poor historian, admitted for AHRF and Afib w/ RVR, pmhx of dementia and s/p ORIF for hip fx on 09/29/24 and SDH after a fall at home examined and seen this morning. Pt. states no acute complaints at this time. Spoke with daughter on the phone regarding advanced directives. Daughter states pt. has not had a BM since friday. Denies SOB, cough, fever, aches, fatigue, CP, n/v/d, abd. pain, palpitations, dizziness.    CODE STATUS : fulll CODE    10/28   PT was seen last week sp consutl.   was more alert and talkative, no complaints but w/ hx of underlying dementia  daughter is his decision maker   after conversations with her last week she was aware of risk/benefits of full code status       This morning pt had a rapid response- which was appropriate to his current level of care, as per nursing he was more lethargic and less interactive.   Over the weekend pt was able to tell staff stories of his life and as of sunday was more withdrawn and somnolent    This morning became hypoxic and even noted to have some hypotension.   He was seen now in the CCU on hiflow with tenuous BP  that had stabalized at 90s/60s CCU aware and plan of care was to start possible pressors as per CCU PA    This morning pts family was called by CCU PA and made clear patient is endstage of his disease and dying.  Our recocmendation too is COMFORT ONLY     10/29  pt critically ill, unlikely to survive hospitalization  on hfnc no longer interacts   lethargic  family came by last night to visit  sturggling w/ pts decline  dnr dni-     reccomendation for comfort      PAIN: ( ) YES  ( X)  NO  Pt unable to characterise d/t clinical status     DYSPNEA ( ) Yes ( X) No  Patient unable to characterise d/t clinical status     PAST MEDICAL & SURGICAL HISTORY:  Dementia      Hard of hearing          ROS  UTO d/t mental status    PHYSICAL EXAM:    reviewed vitals   PPSV2:   10%  FAST:    General:   NAD  Mental Status: lethargic  HEENT: , perrl  Lungs: decreased b/l bs  on hiflow  Cardiac: s1s2 no mgr  GI: soft nontender +BS  : voids   Neuro: lethargic    LABS:                        12.8   11.00 )-----------( 256      ( 24 Oct 2024 06:42 )             40.1     10-24    143  |  113[H]  |  47[H]  ----------------------------<  140[H]  4.4   |  22  |  1.73[H]    Ca    9.0      24 Oct 2024 06:42  Phos  3.7     10-23  Mg     2.6     10-23    TPro  x   /  Alb  x   /  TBili  x   /  DBili  0.3  /  AST  x   /  ALT  x   /  AlkPhos  x   10-24    PT/INR - ( 24 Oct 2024 06:42 )   PT: 17.5 sec;   INR: 1.53 ratio         PTT - ( 22 Oct 2024 18:19 )  PTT:33.0 sec  Albumin: Albumin: 2.2 g/dL (10-23 @ 07:17)      Allergies    No Known Allergies    Intolerances      MEDICATIONS  (STANDING):  MEDICATIONS  (STANDING):  acetaminophen     Tablet .. 975 milliGRAM(s) Oral every 8 hours  chlorhexidine 4% Liquid 1 Application(s) Topical <User Schedule>  dexAMETHasone  Injectable 6 milliGRAM(s) IV Push daily  dextrose 5%. 1000 milliLiter(s) (75 mL/Hr) IV Continuous <Continuous>  doxycycline IVPB 100 milliGRAM(s) IV Intermittent every 12 hours  doxycycline IVPB      doxycycline IVPB 100 milliGRAM(s) IV Intermittent once  finasteride 5 milliGRAM(s) Oral daily  influenza  Vaccine (HIGH DOSE) 0.5 milliLiter(s) IntraMuscular once  melatonin 3 milliGRAM(s) Oral at bedtime  multivitamin 1 Tablet(s) Oral daily  phenylephrine    Infusion 0.1 MICROgram(s)/kG/Min (3.24 mL/Hr) IV Continuous <Continuous>  piperacillin/tazobactam IVPB. 3.375 Gram(s) IV Intermittent once  piperacillin/tazobactam IVPB.. 3.375 Gram(s) IV Intermittent every 8 hours  sodium chloride 0.45%. 1000 milliLiter(s) (1000 mL/Hr) IV Continuous <Continuous>  tamsulosin 0.8 milliGRAM(s) Oral at bedtime  thiamine 100 milliGRAM(s) Oral at bedtime    MEDICATIONS  (PRN):  acetaminophen     Tablet .. 650 milliGRAM(s) Oral every 4 hours PRN Temp greater or equal to 38.5C (101.3F)  albuterol    90 MICROgram(s) HFA Inhaler 2 Puff(s) Inhalation every 4 hours PRN Shortness of Breath and/or Wheezing  oxyCODONE    IR 5 milliGRAM(s) Oral every 8 hours PRN Severe Pain (7 - 10)  polyethylene glycol 3350 17 Gram(s) Oral at bedtime PRN Constipation  senna 2 Tablet(s) Oral at bedtime PRN Constipation      RADIOLOGY/ADDITIONAL STUDIES:
Chief Complaint: Fever, shortness of breath, generalized weakness and fatigue    Interval Hx: Patient seen and examined. Afebrile at this point and breathing rather comfortably at rest but still very weak and deconditioned, poor appetite with low PO intake. BP soft, 90s-110s systolic. Hypernatremic from free water deficit.     ROS: Multi system review is somewhat limited by patient's neurocognitive impairment, appears to be negative x 10 systems except as above    Vitals:  T(F): 97.5 (26 Oct 2024 16:32), Max: 97.5 (25 Oct 2024 20:45)  HR: 87 (26 Oct 2024 16:32) (87 - 102)  BP: 91/42 (26 Oct 2024 16:32) (90/63 - 104/-)  RR: 18 (26 Oct 2024 16:32) (16 - 18)  SpO2: 95% (26 Oct 2024 16:32) (95% - 95%) on O2 via NC    Exam:  GEN: NAD   HEENT: NCAT PERRL EOMI, somewhat dry oral mucosa  NECK: Soft and supple, no JVD  Chest: Normal resp effort at rest, +decreased breath sounds B/L  CVS: S1 S2+, irregular, rate 80  ABD: BS+, soft, NT/ND  EXT: No peripheral edema, 2+ peripheral pulses  SKIN: No rashes  NEURO: AAOx2-3, grossly non-focal     Labs:                        10.6   9.08  )---------( 266                   33.1       152  |  122  |  96  ---------------------<  139  3.8   |  24  |  1.50    Ca    9.0     Phos  2.4     Mg    2.8         TPro  6.3  /  Alb  2.1  /  TBili  0.6  /  DBili  0.3  /  AST  129  /  ALT  142  /  AlkPhos  90      PT: 18.0 sec;   INR: 1.57 ratio      Troponin negative    Procalcitonin 0.41  Lactate 1.8      Ferritin 1175  DDimer 2204    Micro:  COVID19 PCR 10/22: Detected  Flu, RSV PCR 10/22: Negative  Blood culture 10/22: Negative      Imaging:  CT head 10/23: Extra axial high attenuation again seen involving the left frontal parietal region. This finding measures approximately 1.0 cm in widest diameter and previously measured approximately 1.1 cm in widest diameter. This compatible with acute subdural hematoma. There is localized mass effect seen consisting of sulcal effacement. No significant shift or herniation seen. Evaluation of the osseous with the appropriate window appears normal Left maxillary bilateral ethmoid and left frontal sinus callosal thickening seen. Both mastoid and middle ear regions appear clear.    CTA chest PE protocol w/ IV cont 10/22: Few lymph nodes are present in the mediastinum. Heart is enlarged in size. Calcification of the aortic valve and the coronary arteries is noted. No pericardial effusion is noted. Pulmonary arteries are normal in caliber. No filling defects are noted. No endobronchial lesions are noted. Mucous/secretions are noted within few of the bronchi in both lower lobes. Emphysematous changes are present in both lungs. Patchy opacities are noted in the posterior segments of both upper lobes as well as within the peripheral aspect of the right middle and right lower lobes. No pleural effusions are noted. Extensive calcified pleural plaques are noted bilaterally. Moderate size hiatal hernia is noted. Below the diaphragm, visualized portions of the abdomen demonstrate patient sarmad status post cholecystectomy. Low-attenuation lesions within both kidneys are indeterminate based on this exam. Thickening/nodularity of both adrenal glands is noted. Degenerative changes of the spine are noted.    Cardiac Testing:  EKG 10/22: Rate 98. Atrial fibrillation with premature ventricular or aberrantly conducted complexes. Left axis deviation. Right bundle branch block    Prior visit cardiac testing  TTE 9/29/24: Left ventricular cavity is normal in size. Left ventricular wall thickness is normal. Left ventricular systolic function is normal with an ejection fraction visually estimated at 60 to 65 %. The left ventricular diastolic function is indeterminate. Mildly enlarged right ventricular cavity size. Left atrium is severely dilated. The right atrium is moderately dilated. Mild to moderate mitral regurgitation. Mild tricuspid regurgitation. Estimated pulmonary artery systolic pressure is 44 mmHg, consistent with mild pulmonary hypertension. Fibrocalcific aortic valve sclerosis without stenosis. Mild aortic regurgitation.    Meds:  MEDICATIONS  (STANDING):  acetaminophen     Tablet .. 975 milliGRAM(s) Oral every 8 hours  cefTRIAXone Injectable. 1000 milliGRAM(s) IV Push every 24 hours  chlorhexidine 4% Liquid 1 Application(s) Topical <User Schedule>  dexAMETHasone  Injectable 6 milliGRAM(s) IV Push daily  dextrose 5%. 1000 milliLiter(s) (75 mL/Hr) IV Continuous <Continuous>  doxycycline monohydrate Capsule 100 milliGRAM(s) Oral every 12 hours  enoxaparin Injectable 90 milliGRAM(s) SubCutaneous every 12 hours  finasteride 5 milliGRAM(s) Oral daily  influenza  Vaccine (HIGH DOSE) 0.5 milliLiter(s) IntraMuscular once  melatonin 3 milliGRAM(s) Oral at bedtime  metoprolol tartrate 50 milliGRAM(s) Oral two times a day  multivitamin 1 Tablet(s) Oral daily  remdesivir  IVPB 100 milliGRAM(s) IV Intermittent every 24 hours  tamsulosin 0.8 milliGRAM(s) Oral at bedtime    MEDICATIONS  (PRN):  acetaminophen     Tablet .. 650 milliGRAM(s) Oral every 4 hours PRN Temp greater or equal to 38.5C (101.3F)  albuterol    90 MICROgram(s) HFA Inhaler 2 Puff(s) Inhalation every 4 hours PRN Shortness of Breath and/or Wheezing  oxyCODONE    IR 5 milliGRAM(s) Oral every 8 hours PRN Severe Pain (7 - 10)  polyethylene glycol 3350 17 Gram(s) Oral at bedtime PRN Constipation  senna 2 Tablet(s) Oral at bedtime PRN Constipation  
ICU Consult Note    HPI:    S:    Pt seen and examined  This is a 89 y/o M with pmhx of afib, Alzheimer's dementia, L hip fracture s/p ORIF, subdural hematoma 9/29/24, wo presented to  with fever, dyspnea, weakness, found to have acute hypoxic respiratory failure, COVID19 infection, CT chest imaging consistent with COVID19 pneumonia, No PE, and Hyponatremic from free water deficit. CT head demonstrated subdural hematoma- neuro following. Called by RN for hypotension, hypothermia 94.3F and unable to swallow meds this evening SBP 70's.  Pt seen and evaluated at bedside. Pt weak and deconditioned, poor appetite with low PO intake.     RRT 10/27 AM for hypotension, AHRF, XF to ICU svc    10/28: On AHRF. Lethargic, response to voice and pain, largely non verbal. Being resuscitated, pressors hanging.   10/29: Remains on HFNC actively titrating. Remains on pressors. MOLST complete, GOC discussions ongoing but remains with limitations in place.    ROS: UTO        Allergies    No Known Allergies    Intolerances        MEDICATIONS  (STANDING):  acetaminophen     Tablet .. 975 milliGRAM(s) Oral every 8 hours  chlorhexidine 4% Liquid 1 Application(s) Topical <User Schedule>  dexAMETHasone  Injectable 6 milliGRAM(s) IV Push daily  dexMEDEtomidine Infusion 0.2 MICROgram(s)/kG/Hr (4.32 mL/Hr) IV Continuous <Continuous>  dextrose 5%. 1000 milliLiter(s) (75 mL/Hr) IV Continuous <Continuous>  doxycycline IVPB 100 milliGRAM(s) IV Intermittent every 12 hours  doxycycline IVPB      influenza  Vaccine (HIGH DOSE) 0.5 milliLiter(s) IntraMuscular once  multivitamin 1 Tablet(s) Oral daily  phenylephrine    Infusion 0.1 MICROgram(s)/kG/Min (3.24 mL/Hr) IV Continuous <Continuous>  piperacillin/tazobactam IVPB.. 3.375 Gram(s) IV Intermittent every 8 hours  thiamine 100 milliGRAM(s) Oral at bedtime    MEDICATIONS  (PRN):  acetaminophen     Tablet .. 650 milliGRAM(s) Oral every 4 hours PRN Temp greater or equal to 38.5C (101.3F)  albuterol    90 MICROgram(s) HFA Inhaler 2 Puff(s) Inhalation every 4 hours PRN Shortness of Breath and/or Wheezing  morphine  - Injectable 2 milliGRAM(s) IV Push every 1 hour PRN respiratory distress  oxyCODONE    IR 5 milliGRAM(s) Oral every 8 hours PRN Severe Pain (7 - 10)  polyethylene glycol 3350 17 Gram(s) Oral at bedtime PRN Constipation  senna 2 Tablet(s) Oral at bedtime PRN Constipation      Drug Dosing Weight  Height (cm): 182.9 (22 Oct 2024 17:07)  Weight (kg): 86.4 (23 Oct 2024 02:54)  BMI (kg/m2): 25.8 (23 Oct 2024 02:54)  BSA (m2): 2.09 (23 Oct 2024 02:54)      PAST MEDICAL & SURGICAL HISTORY:  Dementia      Hard of hearing          FAMILY HISTORY:        REVIEW OF SYSTEMS    UNLESS OTHERWISE NOTED IN HPI above:    Constitutional:  No Weight Change, No Fever, No Chills, No Night Sweats, No Fatigue, No Malaise  ENT/Mouth:  No Hearing Changes, No Ear Pain, No Nasal Congestion, No  Sinus Pain, No Hoarseness, No sore throat, No Rhinorrhea, No Swallowing  Difficulty  Eyes:  No Eye Pain, No Swelling, No Redness, No Foreign Body, No Discharge, No Vision Changes  Cardiovascular:  No Chest Pain, No SOB, No PND, No Dyspnea on Exertion,  No Orthopnea, No Claudication, No Edema, No Palpitations  Respiratory:  No Cough, No Sputum, No Wheezing, No Smoke Exposure, No Dyspnea  Gastrointestinal:  No Nausea, No Vomiting, No Diarrhea, No  Constipation, No Pain, No Heartburn, No Anorexia, No Dysphagia, No  Hematochezia, No Melena, No Flatulence, No Jaundice  Genitourinary:  No Dysmenorrhea, No DUB, No Dyspareunia, No Dysuria, No  Urinary Frequency, No Hematuria, No Urinary Incontinence, No Urgency,  No Flank Pain, No Urinary Flow Changes, No Hesitancy  Musculoskeletal:  No Arthralgias, No Myalgias, No Joint Swelling, No  Joint Stiffness, No Back Pain, No Neck Pain, No Injury History  Skin:  No Skin Lesions, No Pruritis, No Hair Changes, No Breast/Skin Changes, No Nipple Discharge  Neuro:  No Weakness, No Numbness, No Paresthesias, No Loss of  Consciousness, No Syncope, No Dizziness, No Headache, No Coordination  Changes, No Recent Falls  Psych:  No Anxiety/Panic, No Depression, No Insomnia, No Personality  Changes, No Delusions, No Rumination, No SI/HI/AH/VH, No Social Issues,  No Memory Changes, No Violence/Abuse Hx., No Eating Concerns  Heme/Lymph:  No Bruising, No Bleeding, No Transfusions History, No Lymphadenopathy  Endocrine:  No Polyuria, No Polydipsia, No Temperature Intolerance    O:    ICU Vital Signs Last 24 Hrs  T(C): --  T(F): --  HR: 73 (30 Oct 2024 08:00) (61 - 86)  BP: 110/67 (30 Oct 2024 08:00) (75/50 - 120/75)  BP(mean): 80 (30 Oct 2024 08:00) (59 - 88)  ABP: --  ABP(mean): --  RR: 22 (30 Oct 2024 10:03) (14 - 31)  SpO2: 99% (30 Oct 2024 10:03) (92% - 100%)    O2 Parameters below as of 30 Oct 2024 10:03  Patient On (Oxygen Delivery Method): nasal cannula, high flow  O2 Flow (L/min): 50  O2 Concentration (%): 100            I&O's Detail    29 Oct 2024 07:01  -  30 Oct 2024 07:00  --------------------------------------------------------  IN:    Dexmedetomidine: 282.7 mL    IV PiggyBack: 100 mL    Phenylephrine: 609.2 mL  Total IN: 991.9 mL    OUT:    Intermittent Catheterization - Urethral (mL): 950 mL  Total OUT: 950 mL    Total NET: 41.9 mL              PE:    Adult lying in bed  + HFNC  + acute on chronically ill appearing M  Emaciated  No JVD  S1S2+ tachycardic  Coarse BS B/L  Abd soft NTND  No leg swelling/edema noted  Lethargic, arousable to loud voice by way of opening eyes  Skin pink, warm    LABS:      10-29    157[H]  |  129[H]  |  157[H]  ----------------------------<  156[H]  3.5   |  18[L]  |  2.37[H]    Ca    7.8[L]      29 Oct 2024 08:50  Phos  3.4     10-29  Mg     2.6     10-29    TPro  5.1[L]  /  Alb  1.9[L]  /  TBili  0.7  /  DBili  0.3  /  AST  147[H]  /  ALT  177[H]  /  AlkPhos  125[H]  10-29    PT/INR - ( 29 Oct 2024 08:50 )   PT: 17.5 sec;   INR: 1.49 ratio           Urinalysis Basic - ( 29 Oct 2024 08:50 )    Color: x / Appearance: x / SG: x / pH: x  Gluc: 156 mg/dL / Ketone: x  / Bili: x / Urobili: x   Blood: x / Protein: x / Nitrite: x   Leuk Esterase: x / RBC: x / WBC x   Sq Epi: x / Non Sq Epi: x / Bacteria: x      CAPILLARY BLOOD GLUCOSE      POCT Blood Glucose.: 162 mg/dL (29 Oct 2024 17:57)  POCT Blood Glucose.: 155 mg/dL (29 Oct 2024 11:33)        LIVER FUNCTIONS - ( 29 Oct 2024 08:50 )  Alb: 1.9 g/dL / Pro: 5.1 gm/dL / ALK PHOS: 125 U/L / ALT: 177 U/L / AST: 147 U/L / GGT: x               
HOSPITALIST ATTENDING PROGRESS NOTE     Chart and meds reviewed. Patient seen and examined     Interval Hx/Events   89yom, poor historian, admitted for AHRF and Afib w/ RVR, pmhx of dementia and s/p ORIF for hip fx on 24 and SDH after a fall at home examined and seen this morning. Pt. states no acute complaints at this time. Spoke with daughter on the phone regarding advanced directives. Daughter states pt. has not had a BM since friday. Denies SOB, cough, fever, aches, fatigue, CP, n/v/d, abd. pain, palpitations, dizziness.    All other systems and founds to be negative with exception of what has been described above.         PHYSICAL EXAM:  Vital Signs Last 24 Hrs  T(C): 37.1 (23 Oct 2024 08:40), Max: 39.1 (22 Oct 2024 21:11)  T(F): 98.7 (23 Oct 2024 08:40), Max: 102.3 (22 Oct 2024 21:11)  HR: 74 (23 Oct 2024 08:40) (74 - 102)  BP: 98/67 (23 Oct 2024 08:40) (98/67 - 126/91)  BP(mean): 92 (22 Oct 2024 23:55) (79 - 92)  RR: 19 (23 Oct 2024 08:40) (17 - 20)  SpO2: 98% (23 Oct 2024 08:40) (93% - 98%)    Parameters below as of 23 Oct 2024 08:40  Patient On (Oxygen Delivery Method): room air      Daily Height in cm: 182.88 (22 Oct 2024 17:07)    Daily Weight in k.4 (22 Oct 2024 23:55)    GEN: NAD. pleasant  HEENT: EOMI,  moist mucous membranes, pupils PERRLA  NECK : Soft and supple, no JVD  LUNG: Diffuse mild expiratory wheeze, No rales or rhonchi  CVS: S1S2+, irregular rate and rhythm , no M/G/R  GI: BS+, soft, NT/ND, no guarding, no rebound  EXTREMITIES: No peripheral edema,  VASCULAR: 2+ peripheral pulses, irregular   MSK: Strength 5/5 UE and LE, no atrophy  NEURO: AAOx3, grossly non-focal, confused, impaired short/long term memory, normal affect, normal mood, no aphasia, no drift, CN2-12 intact  SKIN: No rashes    HOME MEDICATIONS:  Home Medications:  acetaminophen 10 mg/mL intravenous solution: 100 milliliter(s) intravenous once As needed Mild Pain (1 - 3) (02 Oct 2024 11:59)  acetaminophen 325 mg oral tablet: 3 tab(s) orally every 8 hours (02 Oct 2024 11:59)  finasteride 5 mg oral tablet: 1 tab(s) orally once a day (02 Oct 2024 11:59)  Lovenox 40 mg/0.4 mL injectable solution: 40 milligram(s) subcutaneously once a day DVT ppx post op (02 Oct 2024 11:59)  melatonin 3 mg oral tablet: 1 tab(s) orally once a day (at bedtime) (02 Oct 2024 11:59)  metoprolol tartrate 25 mg oral tablet: 1 tab(s) orally every 12 hours (02 Oct 2024 11:59)  Multiple Vitamins oral tablet: 1 tab(s) orally once a day (02 Oct 2024 11:59)  oxyCODONE 5 mg oral tablet: 1 tab(s) orally every 4 hours As needed Severe Pain (7 - 10) (02 Oct 2024 11:59)  polyethylene glycol 3350 oral powder for reconstitution: 17 gram(s) orally once a day (at bedtime) As needed Constipation (02 Oct 2024 11:59)  senna leaf extract oral tablet: 2 tab(s) orally once a day (at bedtime) As needed Constipation (02 Oct 2024 11:59)  tamsulosin 0.4 mg oral capsule: 2 cap(s) orally once a day (at bedtime) (02 Oct 2024 11:59)      MEDICATIONS  MEDICATIONS  (STANDING):  acetaminophen     Tablet .. 975 milliGRAM(s) Oral every 8 hours  chlorhexidine 4% Liquid 1 Application(s) Topical <User Schedule>  dexAMETHasone  Injectable 6 milliGRAM(s) IV Push daily  enoxaparin Injectable 30 milliGRAM(s) SubCutaneous every 24 hours  finasteride 5 milliGRAM(s) Oral daily  influenza  Vaccine (HIGH DOSE) 0.5 milliLiter(s) IntraMuscular once  melatonin 3 milliGRAM(s) Oral at bedtime  metoprolol tartrate 25 milliGRAM(s) Oral two times a day  multivitamin 1 Tablet(s) Oral daily  remdesivir  IVPB   IV Intermittent   remdesivir  IVPB 100 milliGRAM(s) IV Intermittent every 24 hours  tamsulosin 0.8 milliGRAM(s) Oral at bedtime      LABS: All Labs Reviewed:                        13.8   10.26 )-----------( 226      ( 22 Oct 2024 17:24 )             43.1     10    x   |  x   |  x   ----------------------------<  x   x    |  x   |  1.25    Ca    9.0      22 Oct 2024 17:24  Phos  3.7     10-23  Mg     2.6     10-23    TPro  6.8  /  Alb  2.2[L]  /  TBili  1.2  /  DBili  0.5[H]  /  AST  97[H]  /  ALT  72  /  AlkPhos  116  10    PT/INR - ( 23 Oct 2024 07:17 )   PT: 15.4 sec;   INR: 1.31 ratio         PTT - ( 22 Oct 2024 18:19 )  PTT:33.0 sec    Urinalysis Basic - ( 22 Oct 2024 17:24 )    Color: x / Appearance: x / SG: x / pH: x  Gluc: 123 mg/dL / Ketone: x  / Bili: x / Urobili: x   Blood: x / Protein: x / Nitrite: x   Leuk Esterase: x / RBC: x / WBC x   Sq Epi: x / Non Sq Epi: x / Bacteria: x        Blood Culture:   I&O's Detail    22 Oct 2024 07:01  -  23 Oct 2024 07:00  --------------------------------------------------------  IN:  Total IN: 0 mL    OUT:    Voided (mL): 300 mL  Total OUT: 300 mL    Total NET: -300 mL        CAPILLARY BLOOD GLUCOSE            CARDIOLOGY TESTING         EKG   < from: 12 Lead ECG (10.22.24 @ 17:28) >  Ventricular Rate 98 BPM    Atrial Rate 59 BPM    QRS Duration 158 ms    Q-T Interval 396 ms    QTC Calculation(Bazett) 505 ms    R Axis -67 degrees    T Axis 34 degrees    Diagnosis Line Atrial fibrillation with premature ventricular or aberrantly conducted complexes  Left axis deviation  ECHO       RADIOLOGY  < from: CT Head No Cont (10.23.24 @ 10:10) >    INTERPRETATION:  CLINICAL INFORMATION: follow up SDH    COMPARISON: Head CT 2024.    Multiple axial sections were performed from the base skull to vertex   without contrast. Coronal and sagittal reconstructions were performed    Extra axial high attenuation again seen involving the left frontal   parietal region. This finding measures approximately 1.0 cm in widest   diameter and previously measured approximately 1.1 cm in widest diameter.   This compatible with acute subdural hematoma. There is localized mass   effect seen consisting of sulcal effacement. No significant shift or   herniation seen.    Evaluation of the osseous with the appropriate window appears normal    Left maxillary bilateral ethmoid and left frontal sinus sinus callosal   thickening seen    Both mastoid and middle ear regions appear clear.    Impression: Acute subdural hematoma again seen as described above.    --- End of Report ---  < from: CT Angio Chest PE Protocol w/ IV Cont (10.22.24 @ 23:05) >    INTERPRETATION:  Clinical information: Elevated d-dimer levels. Exam is   compared to previous study of 2024.    CT angiogram of the chest was obtained following administration of   intravenous contrast. Approximately 70 cc of Omnipaque 350 was   administered. Coronal, sagittal and MIP images were submitted for review.    Few lymph nodes are present in the mediastinum.    Heart is enlarged in size. Calcification of the aortic valve and the   coronary arteries is noted. No pericardial effusion is noted. Pulmonary   arteries are normal in caliber. No filling defects are noted.    No endobronchial lesions are noted. Mucous/secretions are noted within   few of the bronchi in both lower lobes. Emphysematous changes are present   in both lungs. Patchy opacities are noted in the posterior segments of   both upper lobes as well as within the peripheral aspect of the right   middle and right lower lobes. No pleural effusions are noted. Extensive   calcified pleural plaques are noted bilaterally.    Moderate size hiatal hernia is noted.    Below the diaphragm, visualized portions of the abdomen demonstrate   patient sarmad status post cholecystectomy. Low-attenuation lesions within   both kidneys are indeterminate based on this exam. Thickening/nodularity   of both adrenal glands is noted.    Degenerative changes of the spine are noted.    IMPRESSION: No pulmonary embolus is noted.    Patchy opacities in the posterior segments of both upper lobes as well as   within the right middle and right lower lobes are noted as described   above. Exact etiology is unclear. One of the differential diagnostic   considerationincludes dependent atelectasis.    --- End of Report ---      Right bundle branch block  Abnormal ECG          
  CC:    HPI:  89 year old male w recent dx AFIB, Alzheimer dementia,  recent L SDH and L hip fx ORIF 2024, BPH urinary retention w gallegos  BIBEMS for weakness and fatigue    10/28: Doing poorly, minimally responsive, on HF NC O2, Now a DNR/DNI [employer]                  PAST MEDICAL & SURGICAL HISTORY:  Dementia      Hard of hearing          FAMILY HISTORY:      Social Hx:    Allergies    No Known Allergies    Intolerances            ICU Vital Signs Last 24 Hrs  T(C): 36.4 (28 Oct 2024 12:00), Max: 37.2 (28 Oct 2024 08:00)  T(F): 97.5 (28 Oct 2024 12:00), Max: 99 (28 Oct 2024 08:00)  HR: 103 (28 Oct 2024 13:10) (61 - 141)  BP: 66/35 (28 Oct 2024 13:10) (66/35 - 133/94)  BP(mean): 46 (28 Oct 2024 13:10) (46 - 93)  ABP: --  ABP(mean): --  RR: 13 (28 Oct 2024 13:10) (13 - 39)  SpO2: 96% (28 Oct 2024 13:10) (77% - 99%)    O2 Parameters below as of 28 Oct 2024 12:00  Patient On (Oxygen Delivery Method): nasal cannula, high flow  O2 Flow (L/min): 50  O2 Concentration (%): 70            I&O's Summary                            6.6    25.30 )-----------( 303      ( 28 Oct 2024 06:16 )             19.8       10-28    x   |  x   |  x   ----------------------------<  x   x    |  x   |  2.78[H]    Ca    8.2[L]      28 Oct 2024 03:51  Phos  3.2     10-28  Mg     2.5     10-28    TPro  5.2[L]  /  Alb  2.0[L]  /  TBili  0.5  /  DBili  0.2  /  AST  59[H]  /  ALT  128[H]  /  AlkPhos  78  10-28                Urinalysis Basic - ( 28 Oct 2024 10:00 )    Color: Yellow / Appearance: Cloudy / S.017 / pH: x  Gluc: x / Ketone: Negative mg/dL  / Bili: Negative / Urobili: 0.2 mg/dL   Blood: x / Protein: Negative mg/dL / Nitrite: Negative   Leuk Esterase: Large / RBC: 8 /HPF / WBC 41 /HPF   Sq Epi: x / Non Sq Epi: 12 /HPF / Bacteria: Occasional /HPF        MEDICATIONS  (STANDING):  acetaminophen     Tablet .. 975 milliGRAM(s) Oral every 8 hours  chlorhexidine 4% Liquid 1 Application(s) Topical <User Schedule>  dexAMETHasone  Injectable 6 milliGRAM(s) IV Push daily  dexMEDEtomidine Infusion 0.2 MICROgram(s)/kG/Hr (4.32 mL/Hr) IV Continuous <Continuous>  dextrose 5%. 1000 milliLiter(s) (75 mL/Hr) IV Continuous <Continuous>  doxycycline IVPB 100 milliGRAM(s) IV Intermittent every 12 hours  doxycycline IVPB      finasteride 5 milliGRAM(s) Oral daily  influenza  Vaccine (HIGH DOSE) 0.5 milliLiter(s) IntraMuscular once  melatonin 3 milliGRAM(s) Oral at bedtime  multivitamin 1 Tablet(s) Oral daily  phenylephrine    Infusion 0.1 MICROgram(s)/kG/Min (3.24 mL/Hr) IV Continuous <Continuous>  piperacillin/tazobactam IVPB.. 3.375 Gram(s) IV Intermittent every 8 hours  tamsulosin 0.8 milliGRAM(s) Oral at bedtime  thiamine 100 milliGRAM(s) Oral at bedtime    MEDICATIONS  (PRN):  acetaminophen     Tablet .. 650 milliGRAM(s) Oral every 4 hours PRN Temp greater or equal to 38.5C (101.3F)  albuterol    90 MICROgram(s) HFA Inhaler 2 Puff(s) Inhalation every 4 hours PRN Shortness of Breath and/or Wheezing  morphine  - Injectable 2 milliGRAM(s) IV Push every 1 hour PRN respiratory distress  oxyCODONE    IR 5 milliGRAM(s) Oral every 8 hours PRN Severe Pain (7 - 10)  polyethylene glycol 3350 17 Gram(s) Oral at bedtime PRN Constipation  senna 2 Tablet(s) Oral at bedtime PRN Constipation      DVT Prophylaxis:    Advanced Directives:  Discussed with:    Visit Information: 30 min    ** Time is exclusive of billed procedures and/or teaching and/or routine family updates.        
HPI:  89 year old male w recent dx AFIB, Alzheimer dementia,  recent L SDH and L hip fx ORIF 09-, BPH urinary retention w gallegos  BIBEMS for weakness and fatigue    10/28: Doing poorly, minimally responsive, on HF NC O2, Now a DNR/DNI Suraj Capped   10/29: Continues to do poorly on HF NC O2 and suraj       PAST MEDICAL & SURGICAL HISTORY:  Dementia      Hard of hearing          FAMILY HISTORY:      Social Hx:    Allergies    No Known Allergies    Intolerances            ICU Vital Signs Last 24 Hrs  T(C): 36.8 (29 Oct 2024 00:00), Max: 37.1 (28 Oct 2024 17:30)  T(F): 98.3 (29 Oct 2024 00:00), Max: 98.8 (28 Oct 2024 17:30)  HR: 76 (29 Oct 2024 11:07) (71 - 128)  BP: 101/72 (29 Oct 2024 10:00) (66/35 - 108/84)  BP(mean): 83 (29 Oct 2024 10:00) (46 - 93)  ABP: --  ABP(mean): --  RR: 16 (29 Oct 2024 11:07) (11 - 33)  SpO2: 100% (29 Oct 2024 11:07) (79% - 100%)    O2 Parameters below as of 29 Oct 2024 11:07  Patient On (Oxygen Delivery Method): nasal cannula, high flow  O2 Flow (L/min): 50  O2 Concentration (%): 80            I&O's Summary    28 Oct 2024 07:01  -  29 Oct 2024 07:00  --------------------------------------------------------  IN: 3190.7 mL / OUT: 1000 mL / NET: 2190.7 mL                              6.6    25.30 )-----------( 303      ( 28 Oct 2024 06:16 )             19.8       10-29    157[H]  |  129[H]  |  157[H]  ----------------------------<  156[H]  3.5   |  18[L]  |  2.37[H]    Ca    7.8[L]      29 Oct 2024 08:50  Phos  3.4     10-29  Mg     2.6     10-29    TPro  5.1[L]  /  Alb  1.9[L]  /  TBili  0.7  /  DBili  0.3  /  AST  147[H]  /  ALT  177[H]  /  AlkPhos  125[H]  10-29                Urinalysis Basic - ( 29 Oct 2024 08:50 )    Color: x / Appearance: x / SG: x / pH: x  Gluc: 156 mg/dL / Ketone: x  / Bili: x / Urobili: x   Blood: x / Protein: x / Nitrite: x   Leuk Esterase: x / RBC: x / WBC x   Sq Epi: x / Non Sq Epi: x / Bacteria: x        MEDICATIONS  (STANDING):  acetaminophen     Tablet .. 975 milliGRAM(s) Oral every 8 hours  chlorhexidine 4% Liquid 1 Application(s) Topical <User Schedule>  dexAMETHasone  Injectable 6 milliGRAM(s) IV Push daily  dexMEDEtomidine Infusion 0.2 MICROgram(s)/kG/Hr (4.32 mL/Hr) IV Continuous <Continuous>  dextrose 5%. 1000 milliLiter(s) (75 mL/Hr) IV Continuous <Continuous>  doxycycline IVPB 100 milliGRAM(s) IV Intermittent every 12 hours  doxycycline IVPB      influenza  Vaccine (HIGH DOSE) 0.5 milliLiter(s) IntraMuscular once  multivitamin 1 Tablet(s) Oral daily  phenylephrine    Infusion 0.1 MICROgram(s)/kG/Min (3.24 mL/Hr) IV Continuous <Continuous>  piperacillin/tazobactam IVPB.. 3.375 Gram(s) IV Intermittent every 8 hours  thiamine 100 milliGRAM(s) Oral at bedtime    MEDICATIONS  (PRN):  acetaminophen     Tablet .. 650 milliGRAM(s) Oral every 4 hours PRN Temp greater or equal to 38.5C (101.3F)  albuterol    90 MICROgram(s) HFA Inhaler 2 Puff(s) Inhalation every 4 hours PRN Shortness of Breath and/or Wheezing  morphine  - Injectable 2 milliGRAM(s) IV Push every 1 hour PRN respiratory distress  oxyCODONE    IR 5 milliGRAM(s) Oral every 8 hours PRN Severe Pain (7 - 10)  polyethylene glycol 3350 17 Gram(s) Oral at bedtime PRN Constipation  senna 2 Tablet(s) Oral at bedtime PRN Constipation      DVT Prophylaxis:    Advanced Directives:  Discussed with:    Visit Information: 30 min    ** Time is exclusive of billed procedures and/or teaching and/or routine family updates.        
HPI:  CHIEF COMPLAINT: Patient is a 89y old  Male who presents with a chief complaint of AHRF & Afib w/ RVR (23 Oct 2024 12:11)    HPI:  89 year old male w recent dx AFIB, Alzheimer dementia,  recent L SDH and L hip fx ORIF 09-, BPH urinary retention w gallegos  BIBEMS for weakness and fatigue  Recent adm 09-29 to 10- adm to ICU s/p fall from standing on to carpeted floor sustaining L hip fx and L SDH.    DC to [facility] for rehab on lovenox 40 mg qd x 4 weeks  [facility]   since yesterday:  +fever +SOB +weakness.  History obtained from family at bedside when they were in ED   At time of my interview and exam, pt was alone  I was contacted by ED nurse that d-dimer was 1900; I ordered CTA  In ED /91   HR 94   RR 17   T 97.5   93% sat RA  AFIB w , O2 89% RA,  96% with 2 L nc   NS 1000 cc  CXR neg , remdesivir, decadron  Being admitted to our service for AHRF d/t  COVID-19, AFIB w RVR    Cardiology consulted for new Afib and advice regarding starting DOAC in apt. with Hx of ICH, recurrent Falls and dementia.     10/25/24 - no cardiac complaints, Tele: Afib  - BB dose increased   10/26/'24: no complaints Afib 70-110s leads on pt's back poor signals  10/27/24: no complaints, non complaint with TELE monitor     MEDICATIONS:  OUTPATIENT  Home Medications:  acetaminophen 10 mg/mL intravenous solution: 100 milliliter(s) intravenous once As needed Mild Pain (1 - 3) (02 Oct 2024 11:59)  acetaminophen 325 mg oral tablet: 3 tab(s) orally every 8 hours (02 Oct 2024 11:59)  finasteride 5 mg oral tablet: 1 tab(s) orally once a day (02 Oct 2024 11:59)  Lovenox 40 mg/0.4 mL injectable solution: 40 milligram(s) subcutaneously once a day DVT ppx post op (02 Oct 2024 11:59)  melatonin 3 mg oral tablet: 1 tab(s) orally once a day (at bedtime) (02 Oct 2024 11:59)  metoprolol tartrate 25 mg oral tablet: 1 tab(s) orally every 12 hours (02 Oct 2024 11:59)  Multiple Vitamins oral tablet: 1 tab(s) orally once a day (02 Oct 2024 11:59)  oxyCODONE 5 mg oral tablet: 1 tab(s) orally every 4 hours As needed Severe Pain (7 - 10) (02 Oct 2024 11:59)  polyethylene glycol 3350 oral powder for reconstitution: 17 gram(s) orally once a day (at bedtime) As needed Constipation (02 Oct 2024 11:59)  senna leaf extract oral tablet: 2 tab(s) orally once a day (at bedtime) As needed Constipation (02 Oct 2024 11:59)  tamsulosin 0.4 mg oral capsule: 2 cap(s) orally once a day (at bedtime) (02 Oct 2024 11:59)      INPATIENT  MEDICATIONS  (STANDING):  MEDICATIONS  (STANDING):  acetaminophen     Tablet .. 975 milliGRAM(s) Oral every 8 hours  cefTRIAXone Injectable. 1000 milliGRAM(s) IV Push every 24 hours  chlorhexidine 4% Liquid 1 Application(s) Topical <User Schedule>  dexAMETHasone  Injectable 6 milliGRAM(s) IV Push daily  dextrose 5%. 1000 milliLiter(s) (75 mL/Hr) IV Continuous <Continuous>  doxycycline monohydrate Capsule 100 milliGRAM(s) Oral every 12 hours  enoxaparin Injectable 90 milliGRAM(s) SubCutaneous every 12 hours  finasteride 5 milliGRAM(s) Oral daily  influenza  Vaccine (HIGH DOSE) 0.5 milliLiter(s) IntraMuscular once  melatonin 3 milliGRAM(s) Oral at bedtime  metoprolol tartrate 50 milliGRAM(s) Oral two times a day  multivitamin 1 Tablet(s) Oral daily  tamsulosin 0.8 milliGRAM(s) Oral at bedtime    MEDICATIONS  (PRN):  acetaminophen     Tablet .. 650 milliGRAM(s) Oral every 4 hours PRN Temp greater or equal to 38.5C (101.3F)  albuterol    90 MICROgram(s) HFA Inhaler 2 Puff(s) Inhalation every 4 hours PRN Shortness of Breath and/or Wheezing  oxyCODONE    IR 5 milliGRAM(s) Oral every 8 hours PRN Severe Pain (7 - 10)  polyethylene glycol 3350 17 Gram(s) Oral at bedtime PRN Constipation  senna 2 Tablet(s) Oral at bedtime PRN Constipation        Vital Signs Last 24 Hrs  Vital Signs Last 24 Hrs  T(C): 36.3 (27 Oct 2024 02:10), Max: 36.4 (26 Oct 2024 16:32)  T(F): 97.3 (27 Oct 2024 02:10), Max: 97.5 (26 Oct 2024 16:32)  HR: 76 (27 Oct 2024 08:05) (76 - 87)  BP: 98/68 (27 Oct 2024 08:05) (91/42 - 103/70)  BP(mean): --  RR: 18 (27 Oct 2024 08:05) (18 - 18)  SpO2: 91% (27 Oct 2024 08:05) (90% - 95%)    Parameters below as of 27 Oct 2024 08:05  Patient On (Oxygen Delivery Method): nasal cannula        Daily     Daily I&O's Summary    25 Oct 2024 07:01  -  26 Oct 2024 07:00  --------------------------------------------------------  IN: 780 mL / OUT: 356 mL / NET: 424 mL        I&O's Detail    25 Oct 2024 07:01  -  26 Oct 2024 07:00  --------------------------------------------------------  IN:    Oral Fluid: 780 mL  Total IN: 780 mL    OUT:    Voided (mL): 356 mL  Total OUT: 356 mL    Total NET: 424 mL          I&O's Summary    25 Oct 2024 07:01  -  26 Oct 2024 07:00  --------------------------------------------------------  IN: 780 mL / OUT: 356 mL / NET: 424 mL        PHYSICAL EXAM:    Constitutional: NAD, awake  HEENT:  No oral cyanosis.  Pulmonary: Non-labored, breath sounds are clear bilaterally  Cardiovascular: S1 and S2, irregular, normal rate  Gastrointestinal: Bowel Sounds present, soft, nontender.   Lymph: No edema. No cervical lymphadenopathy.  Neurological: Moves all extremities, interactive, no focal deficits  Skin: No rashes.  Psych:  Mood & affect appropriate      ===============================  ===============================  LABS:   LABS: All Labs Reviewed:                        10.6 9.08  )-----------( 266      ( 26 Oct 2024 06:26 )             33.1     10-27    152[H]  |  124[H]  |  148[H]  ----------------------------<  171[H]  3.9   |  19[L]  |  2.18[H]    Ca    8.8      27 Oct 2024 06:15    TPro  5.7[L]  /  Alb  2.0[L]  /  TBili  0.5  /  DBili  0.1  /  AST  116[H]  /  ALT  166[H]  /  AlkPhos  81  10-27    PT/INR - ( 27 Oct 2024 06:15 )   PT: 20.4 sec;   INR: 1.74 ratio                                           10.6   9.08  )-----------( 266      ( 26 Oct 2024 06:26 )             33.1                         11.9   9.92  )-----------( 212      ( 25 Oct 2024 07:17 )             37.0                         12.8   11.00 )-----------( 256      ( 24 Oct 2024 06:42 )             40.1     26 Oct 2024 06:26    152    |  122    |  96     ----------------------------<  139    3.8     |  24     |  1.50   25 Oct 2024 07:17    145    |  118    |  75     ----------------------------<  133    5.5     |  22     |  1.26   24 Oct 2024 06:42    143    |  113    |  47     ----------------------------<  140    4.4     |  22     |  1.73     Ca    9.0        26 Oct 2024 06:26  Ca    8.8        25 Oct 2024 07:17  Ca    9.0        24 Oct 2024 06:42  Phos  2.4       25 Oct 2024 07:17  Mg     2.8       25 Oct 2024 07:17    TPro  6.3    /  Alb  2.1    /  TBili  0.6    /  DBili  0.3    /  AST  129    /  ALT  142    /  AlkPhos  90     26 Oct 2024 06:26  TPro  6.6    /  Alb  2.0    /  TBili  0.6    /  DBili  0.1    /  AST  125    /  ALT  109    /  AlkPhos  97     25 Oct 2024 07:17  TPro  x      /  Alb  x      /  TBili  x      /  DBili  0.3    /  AST  x      /  ALT  x      /  AlkPhos  x      24 Oct 2024 06:42    PT/INR - ( 26 Oct 2024 06:26 )   PT: 18.0 sec;   INR: 1.57 ratio           ===============================  ===============================  CARDIAC BIOMARKERS:  BNP      TROPONIN  Troponin I, High Sensitivity Result: 15.56 ng/L (10-23-24 @ 07:17)  Troponin I, High Sensitivity Result: 21.16 ng/L (10-22-24 @ 17:24)  Troponin I, High Sensitivity Result: 10.66 ng/L (09-29-24 @ 00:08)    ===============================  ===============================  Radiology?EKG/TTE: reviewed     TTE W or WO Ultrasound Enhancing Agent (09.29.24 @ 10:06) >   1. Left ventricular cavity is normal in size. Left ventricular wall thickness is normal. Left ventricular systolic function is normal with an ejection fraction visually estimated at 60 to 65 %.   2. The left ventricular diastolic function is indeterminate.   3. Mildly enlarged right ventricular cavity size.   4. Left atrium is severely dilated.   5. The right atrium is moderately dilated.   6. Mild to moderate mitral regurgitation.   7. Mild tricuspid regurgitation.   8. Estimated pulmonary artery systolic pressure is 44 mmHg, consistent with mild pulmonary hypertension.   9. Fibrocalcific aortic valve sclerosis without stenosis.  10. Mild aortic regurgitation.    12 Lead ECG (10.22.24 @ 17:28) >  Atrial fibrillation with premature ventricular or aberrantly conducted complexes  Left axis deviation  Right bundle branch block  Abnormal ECG      
  CHIEF COMPLAINT: Patient is a 89y old  Male who presents with a chief complaint of AHRF & Afib w/ RVR (23 Oct 2024 12:11)    HPI:  89 year old male w recent dx AFIB, Alzheimer dementia,  recent L SDH and L hip fx ORIF 09-, BPH urinary retention w gallegos  BIBEMS for weakness and fatigue  Recent adm 09-29 to 10- adm to ICU s/p fall from standing on to carpeted floor sustaining L hip fx and L SDH.    DC to [facility] for rehab on lovenox 40 mg qd x 4 weeks  [facility]   since yesterday:  +fever +SOB +weakness.  History obtained from family at bedside when they were in ED   At time of my interview and exam, pt was alone  I was contacted by ED nurse that d-dimer was 1900; I ordered CTA  In ED /91   HR 94   RR 17   T 97.5   93% sat RA  AFIB w , O2 89% RA,  96% with 2 L nc   NS 1000 cc  CXR neg , remdesivir, decadron  Being admitted to our service for AHRF d/t  COVID-19, AFIB w RVR    Cardiology consulted for new Afib and advice regarding starting DOAC in apt. with Hx of ICH, recurrent Falls and dementia.     10/25/24 - no cardiac complaints, Tele: Afib  - BB dose increased       MEDICATIONS  (STANDING):  acetaminophen     Tablet .. 975 milliGRAM(s) Oral every 8 hours  cefTRIAXone Injectable. 1000 milliGRAM(s) IV Push every 24 hours  chlorhexidine 4% Liquid 1 Application(s) Topical <User Schedule>  dexAMETHasone  Injectable 6 milliGRAM(s) IV Push daily  doxycycline monohydrate Capsule 100 milliGRAM(s) Oral every 12 hours  enoxaparin Injectable 90 milliGRAM(s) SubCutaneous every 24 hours  finasteride 5 milliGRAM(s) Oral daily  influenza  Vaccine (HIGH DOSE) 0.5 milliLiter(s) IntraMuscular once  lactated ringers. 1000 milliLiter(s) (50 mL/Hr) IV Continuous <Continuous>  melatonin 3 milliGRAM(s) Oral at bedtime  metoprolol tartrate 50 milliGRAM(s) Oral two times a day  multivitamin 1 Tablet(s) Oral daily  remdesivir  IVPB 100 milliGRAM(s) IV Intermittent every 24 hours  remdesivir  IVPB   IV Intermittent   tamsulosin 0.8 milliGRAM(s) Oral at bedtime    MEDICATIONS  (PRN):  acetaminophen     Tablet .. 650 milliGRAM(s) Oral every 4 hours PRN Temp greater or equal to 38.5C (101.3F)  albuterol    90 MICROgram(s) HFA Inhaler 2 Puff(s) Inhalation every 4 hours PRN Shortness of Breath and/or Wheezing  oxyCODONE    IR 5 milliGRAM(s) Oral every 6 hours PRN Severe Pain (7 - 10)  polyethylene glycol 3350 17 Gram(s) Oral at bedtime PRN Constipation  senna 2 Tablet(s) Oral at bedtime PRN Constipation    Vital Signs Last 24 Hrs  T(C): 36.1 (25 Oct 2024 09:00), Max: 36.1 (25 Oct 2024 09:00)  T(F): 97 (25 Oct 2024 09:00), Max: 97 (25 Oct 2024 09:00)  HR: 116 (25 Oct 2024 09:00) (93 - 116)  BP: 131/85 (25 Oct 2024 09:00) (118/72 - 131/85)  BP(mean): 97 (25 Oct 2024 09:00) (97 - 97)  RR: 18 (25 Oct 2024 09:00) (18 - 18)  SpO2: 94% (25 Oct 2024 10:00) (90% - 95%)    Parameters below as of 25 Oct 2024 10:00    O2 Flow (L/min): 2      PHYSICAL EXAM:  Constitutional: NAD, awake  HEENT:  No oral cyanosis.  Pulmonary: Non-labored, breath sounds are clear bilaterally  Cardiovascular: S1 and S2, irregular, normal rate  Gastrointestinal: Bowel Sounds present, soft, nontender.   Lymph: No edema. No cervical lymphadenopathy.  Neurological: Moves all extremities, interactive, no focal deficits  Skin: No rashes.  Psych:  Mood & affect appropriate    LABS:  reviewed                             11.9   9.92  )-----------( 212      ( 25 Oct 2024 07:17 )             37.0     10-25    145  |  118[H]  |  75[H]  ----------------------------<  133[H]  5.5[H]   |  22  |  1.26    Ca    8.8      25 Oct 2024 07:17  Phos  2.4     10-25  Mg     2.8     10-25    TPro  6.6  /  Alb  2.0[L]  /  TBili  0.6  /  DBili  0.1  /  AST  125[H]  /  ALT  109[H]  /  AlkPhos  97  10-25    - TroponinI hsT: <-15.56, <-21.16    Radiology?EKG/TTE: reviewed     TTE W or WO Ultrasound Enhancing Agent (09.29.24 @ 10:06) >   1. Left ventricular cavity is normal in size. Left ventricular wall thickness is normal. Left ventricular systolic function is normal with an ejection fraction visually estimated at 60 to 65 %.   2. The left ventricular diastolic function is indeterminate.   3. Mildly enlarged right ventricular cavity size.   4. Left atrium is severely dilated.   5. The right atrium is moderately dilated.   6. Mild to moderate mitral regurgitation.   7. Mild tricuspid regurgitation.   8. Estimated pulmonary artery systolic pressure is 44 mmHg, consistent with mild pulmonary hypertension.   9. Fibrocalcific aortic valve sclerosis without stenosis.  10. Mild aortic regurgitation.    12 Lead ECG (10.22.24 @ 17:28) >  Atrial fibrillation with premature ventricular or aberrantly conducted complexes  Left axis deviation  Right bundle branch block  Abnormal ECG        
HPI:  89 year old male w recent dx AFIB, Alzheimer dementia,  recent L SDH and L hip fx ORIF 09-, BPH urinary retention w gallegos  BIBEMS for weakness and fatigue    10/28: Doing poorly, minimally responsive, on HF NC O2, Now a DNR/DNI Suraj Capped   10/29: Continues to do poorly on HF NC O2 and suraj  10/30: On Suraj and HF NC O2 still    PAST MEDICAL & SURGICAL HISTORY:  Dementia      Hard of hearing          FAMILY HISTORY:      Social Hx:    Allergies    No Known Allergies    Intolerances            ICU Vital Signs Last 24 Hrs  T(C): --  T(F): --  HR: 73 (30 Oct 2024 08:00) (61 - 86)  BP: 110/67 (30 Oct 2024 08:00) (75/50 - 120/75)  BP(mean): 80 (30 Oct 2024 08:00) (59 - 88)  ABP: --  ABP(mean): --  RR: 22 (30 Oct 2024 10:03) (14 - 31)  SpO2: 99% (30 Oct 2024 10:03) (92% - 100%)    O2 Parameters below as of 30 Oct 2024 10:03  Patient On (Oxygen Delivery Method): nasal cannula, high flow  O2 Flow (L/min): 50  O2 Concentration (%): 100            I&O's Summary    29 Oct 2024 07:01  -  30 Oct 2024 07:00  --------------------------------------------------------  IN: 991.9 mL / OUT: 950 mL / NET: 41.9 mL            10-29    157[H]  |  129[H]  |  157[H]  ----------------------------<  156[H]  3.5   |  18[L]  |  2.37[H]    Ca    7.8[L]      29 Oct 2024 08:50  Phos  3.4     10-29  Mg     2.6     10-29    TPro  5.1[L]  /  Alb  1.9[L]  /  TBili  0.7  /  DBili  0.3  /  AST  147[H]  /  ALT  177[H]  /  AlkPhos  125[H]  10-29                Urinalysis Basic - ( 29 Oct 2024 08:50 )    Color: x / Appearance: x / SG: x / pH: x  Gluc: 156 mg/dL / Ketone: x  / Bili: x / Urobili: x   Blood: x / Protein: x / Nitrite: x   Leuk Esterase: x / RBC: x / WBC x   Sq Epi: x / Non Sq Epi: x / Bacteria: x        MEDICATIONS  (STANDING):  acetaminophen     Tablet .. 975 milliGRAM(s) Oral every 8 hours  chlorhexidine 4% Liquid 1 Application(s) Topical <User Schedule>  dexAMETHasone  Injectable 6 milliGRAM(s) IV Push daily  dexMEDEtomidine Infusion 0.2 MICROgram(s)/kG/Hr (4.32 mL/Hr) IV Continuous <Continuous>  dextrose 5%. 1000 milliLiter(s) (75 mL/Hr) IV Continuous <Continuous>  doxycycline IVPB 100 milliGRAM(s) IV Intermittent every 12 hours  doxycycline IVPB      influenza  Vaccine (HIGH DOSE) 0.5 milliLiter(s) IntraMuscular once  multivitamin 1 Tablet(s) Oral daily  phenylephrine    Infusion 0.1 MICROgram(s)/kG/Min (3.24 mL/Hr) IV Continuous <Continuous>  piperacillin/tazobactam IVPB.. 3.375 Gram(s) IV Intermittent every 8 hours  thiamine 100 milliGRAM(s) Oral at bedtime    MEDICATIONS  (PRN):  acetaminophen     Tablet .. 650 milliGRAM(s) Oral every 4 hours PRN Temp greater or equal to 38.5C (101.3F)  albuterol    90 MICROgram(s) HFA Inhaler 2 Puff(s) Inhalation every 4 hours PRN Shortness of Breath and/or Wheezing  morphine  - Injectable 2 milliGRAM(s) IV Push every 1 hour PRN respiratory distress  oxyCODONE    IR 5 milliGRAM(s) Oral every 8 hours PRN Severe Pain (7 - 10)  polyethylene glycol 3350 17 Gram(s) Oral at bedtime PRN Constipation  senna 2 Tablet(s) Oral at bedtime PRN Constipation      DVT Prophylaxis:    Advanced Directives:  Discussed with:    Visit Information: 30 min    ** Time is exclusive of billed procedures and/or teaching and/or routine family updates.

## 2024-10-30 NOTE — PROGRESS NOTE ADULT - ASSESSMENT
89 year old male w recent dx AFIB, Alzheimer dementia,  recent L SDH and L hip fx ORIF 09-, BPH urinary retention w gallegos -BIBEMS for weakness and fatigue Recent adm 09-29 to 10- adm to ICU s/p fall from standing on to carpeted floor sustaining L hip fx and L SDH.  DC to [facility] for rehab on lovenox 40 mg qd x 4 weeks Recently at [facility] rehab where he contracted COVID +fever +SOB +weakness.  History obtained from family at bedside when they were in ED - In ED /91   HR 94   RR 17   T 97.5   93% sat RA AFIB w , O2 89% RA,  96% with 2 L nc  - NS 1000 cc CXR neg. Being admitted to our service for AHRF d/t COVID-19, AFIB w RVR, imaging shows Patchy opacities in the posterior segments of both upper lobes as well as within the right middle and right lower lobes are noted as described above. Exact etiology is unclear. One of the differential diagnostic consideration includes dependent atelectasis. Started on IV abx - remdesivir, decadron, abx.     1. Acute respiratory failure. Covid-19 Viral Syndrome. Multifocal pneumonia. Alzheimers dementia. BPH/Urinary retention. VICTORIA  - imaging reviewed  - on remdesivir, decadron #3  - on rocephin/doxycycline #3 for superimposed bacterial pna coverage  - continue with above abx coverage   - monitor temps  - tolerating abx well so far; no side effects noted  - reason for abx use and side effects reviewed with patient  - supportive care  - fu cbc  - monitor temps  - fu cultures    Clinical team may change from intravenous to oral antibiotics when the following criteria are met:   1. Patient is clinically improving/stable       a)	Improved signs and symptoms of infection from initial presentation       b)	Afebrile for 24 hours       c)	Leukocytosis trending towards normal range   2. Patient is tolerating oral intake   3. Initial/repeat blood cultures are negative   .  - when above met change to po doxycycline x 5 days total, po ceftin x 7days      
89 year old male w recent dx AFIB, Alzheimer dementia,  recent L SDH and L hip fx ORIF 09-, BPH urinary retention w gallegos -BIBEMS for weakness and fatigue Recent adm 09-29 to 10- adm to ICU s/p fall from standing on to carpeted floor sustaining L hip fx and L SDH.  DC to [facility] for rehab on lovenox 40 mg qd x 4 weeks Recently at [facility] rehab where he contracted COVID +fever +SOB +weakness.  History obtained from family at bedside when they were in ED - In ED /91   HR 94   RR 17   T 97.5   93% sat RA AFIB w , O2 89% RA,  96% with 2 L nc  - NS 1000 cc CXR neg. Being admitted to our service for AHRF d/t COVID-19, AFIB w RVR, imaging shows Patchy opacities in the posterior segments of both upper lobes as well as within the right middle and right lower lobes are noted as described above. Exact etiology is unclear. One of the differential diagnostic consideration includes dependent atelectasis. Started on IV abx - remdesivir, decadron, abx.     1. Acute respiratory failure. Septic shock. Covid-19 Viral Syndrome. Multifocal pneumonia. Alzheimers dementia. BPH/Urinary retention. VICTORIA  - imaging reviewed worsening resp failure now with multiorgan failure  - s/p remdesivir, on decadron #6  - s/p rocephin #5 - abx broadened to zosyn  - on doxycycline #6  - abx for superimposed bacterial pna coverage  - continue with abx coverage   - monitor temps  - tolerating abx well so far; no side effects noted  - reason for abx use and side effects reviewed with patient  - supportive care  - fu cbc  - monitor temps  - poor prognosis       Clinical team may change from intravenous to oral antibiotics when the following criteria are met:   1. Patient is clinically improving/stable       a)	Improved signs and symptoms of infection from initial presentation       b)	Afebrile for 24 hours       c)	Leukocytosis trending towards normal range   2. Patient is tolerating oral intake   3. Initial/repeat blood cultures are negative   .  requires IV abx now as critically ill       
89yom, pmhx alzheimer dementia, s/p ORIF for L hip fx and SDH 09-, urinary retention, and ICH (2018), admitted for COVID-19 active infxn and Afib w/ RVR. Pt. has no acute complaints today. Pt daughter (healthcare proxy) states pt has not had a BM since friday. Imaging notable for acute subdural hematoma on CT head and patchy opacities in the upper lobes b/l and R middle and lower lobes of the lungs of unknown etiology. Labs notable for neutrophilia w/ left shift and elevated procalcitonin. PE notable for b/l diffuse expiratory wheezing. VSS and WNL. Pt received 1L bolus NS in ED for dehydration.    #COVID-19 active infxn  #B/l diffuse expiratory wheezing  Neutrophilia w/ left shift seen on CBC  Elevated procalcitonin  Patchy opacities seen in the upper lobes b/l and R middle and lower lobes of the lungs w/ unkwn etiology  -C/w remdesivir  -c/w dexamethasone  -Start PRN albuterol   -Monitor daily labs  -c/w isolation protocols  -continue to monitor SPO2 w/ target of 94%    #Afib w/ RVR  HR stable at 74bpm today  Seen on EKG, see above  Troponins negative  -c/w metoprolol  -Per Ns c/w lovenox for AC  -Cardiology consulted    #Acute SDH vs Evidence of old traumatic SDH  -Neurosurgery appreciated, most likely evidence of prior SDH and not acute  -c/w Lovenox per neurosurgery    #Constipation 2/2 opoid use  -C/w miralax  -c/w senna    #BPH w/ retention  -c/w tamsulosin  -c/w finasteride    #S/p L hip fx and ORIF 2/2 fall 09-29-24  -c/w prn Oxycodone for pain management  -PT following    VTE ppx: Lovenox  Diet: DASH/TLC sodium/cholesterol restricted  FULL CODE (spoke with daughter regarding, she is speaking with family to determine how to proceed)      
Process of Care  --Reviewed dx/treatment problems and alignment with Goals of Care    SP RRT this morning  resp failure/somnolence /   now upgraded to CCU   pt currently DNR DNI     COVID 19  c/w remdesivir  cw demethasone  prn albuterol   on isolation    afib rvr  rate regular today  monitor and continue w rate control  pt on AC     SDH   neurosurgery aware- prior sdh and not signs of acute    Physical Aspects of Care  --Pain  patient denies at this time  c/w current managment    --Bowel Regimen  denies constipation  risk for constipation d/t immobility  daily dulcolax    --Dyspnea  No SOB at this time  comfortable and in NAD    --Nausea Vomiting  denies    --Weakness  PT as tolerated     Psychological and Psychiatric Aspects of Care:   --Greif/Bereavment: emotional support provided  --Hx of psychiatric dx: none  -Pastoral Care Available PRN     Social Aspects of Care  -SW involved     Cultural Aspects  -Primary Language: English    Goals of Care:     We discussed Palliative Care team being a supportive team when a patient has ongoing illnesses.  We also discussed that it is not an end of life care service, but can help navigate symptoms and emotional support througout their hospital stay here.    Hospice was explained as well  as an end of life care philosophy.  When a disease cannot be cured, or family/patient decide the treatment burdens out weigh the risk and one choses to change focus of treatment from cure to quality/comfort.       Prognosis: Death can occur at anytime, but if disease continues to progress naturally patient likely has horus to days    Ethical and Legal Aspects:   NA        Capacity: none  HCP/Surrogate:Daphne ([phone])    Code Status: dnr dni  MOLST: dnr dni   Dispo Plan: as per CCU    Discussed With: Case coordinated with attending and SW and RN     Time Spent: 55 minutes including the care, coordination and counseling of this patient, 50% of which was spent coordinating and counseling. 
89 year old male w recent dx AFIB, Alzheimer dementia,  recent L SDH and L hip fx ORIF 09-, BPH urinary retention w gallegos -BIBEMS for weakness and fatigue Recent adm 09-29 to 10- adm to ICU s/p fall from standing on to carpeted floor sustaining L hip fx and L SDH.  DC to [facility] for rehab on lovenox 40 mg qd x 4 weeks Recently at [facility] rehab where he contracted COVID +fever +SOB +weakness.  History obtained from family at bedside when they were in ED - In ED /91   HR 94   RR 17   T 97.5   93% sat RA AFIB w , O2 89% RA,  96% with 2 L nc  - NS 1000 cc CXR neg. Being admitted to our service for AHRF d/t COVID-19, AFIB w RVR, imaging shows Patchy opacities in the posterior segments of both upper lobes as well as within the right middle and right lower lobes are noted as described above. Exact etiology is unclear. One of the differential diagnostic consideration includes dependent atelectasis. Started on IV abx - remdesivir, decadron, abx.     1. Acute respiratory failure. Covid-19 Viral Syndrome. Multifocal pneumonia. Alzheimers dementia. BPH/Urinary retention. VICTORIA  - imaging reviewed  - on remdesivir, decadron #4   - on rocephin/doxycycline #4 for superimposed bacterial pna coverage  - continue with above abx coverage   - monitor temps  - tolerating abx well so far; no side effects noted  - reason for abx use and side effects reviewed with patient  - supportive care  - fu cbc  - monitor temps  - fu cultures    Clinical team may change from intravenous to oral antibiotics when the following criteria are met:   1. Patient is clinically improving/stable       a)	Improved signs and symptoms of infection from initial presentation       b)	Afebrile for 24 hours       c)	Leukocytosis trending towards normal range   2. Patient is tolerating oral intake   3. Initial/repeat blood cultures are negative   .  - when above met change to po doxycycline x 5 days total, po ceftin x 7days      
89y year old man with hx of afib, Alzheimers dementia, L hip fracture s/p ORIF, subdural hematoma 9/29/24, presented with fever, dyspnea, weakness, found to have acute hypoxic respiratory failure, COVID19 infection, CT chest imaging consistent with COVID19 pneumonia. No PE. CT head redemonstated subdural hematoma. Admitted to Medicine.     Acute hypoxic respiratory failure due to COVID19 pneumonia, unable to rule out superimposed bacterial pneumonia, unable to rule out Gram-negative organism, POA  Remains medically acute. Continues to require supplemental O2. He is generally weak and frail. PO intake low. Hypernatremic in this setting.   - Continue remdesivir  - Continue dexamethasone  - Continue empiric doxycycline and ceftriaxone  - Continue O2 supplementation  - Full dose AC in setting of COVID19 pneumonia, marked DDimer elevation (also has afib)  - Monitor LFTs  - F/u with ID    Afib w/ RVR  In setting of acute infection with COVID19, pneumonia  - Continue rate control with Metoprolol  - Full dose AC ok as per Neurosurgery    Constipation  Multi factorial in setting of acute illness, diminished mobility, low water intake, opioid use  - Continue bowel regimen, monitor for BM    BPH w/ retention  Urinary retention earlier this admission, since resolved. Passed trial of void  - Continue tamsulosin, finasteride    Hx of fall, L hip fracture, ORIF 9/2024  Stable  - Continue pain control as needed  - PT restorative sessions      Palliative Care consulted to assist with GOC  
89y year old man with hx of afib, Alzheimers dementia, L hip fracture s/p ORIF, subdural hematoma 9/29/24, presented with fever, dyspnea, weakness, found to have acute hypoxic respiratory failure, COVID19 infection, CT chest imaging consistent with COVID19 pneumonia. No PE. CT head redemonstated subdural hematoma. Admitted to Medicine.     Acute hypoxic respiratory failure due to COVID19 pneumonia, unable to rule out superimposed bacterial pneumonia, unable to rule out Gram-negative organism, POA  Remains medically acute. Continues to require supplemental O2. He is generally weak and frail. PO intake low. Hypernatremic in this setting. Completed 5-day course of remdesivir.   - Continue dexamethasone, day 5 today  - Continue empiric doxycycline and ceftriaxone, day 5 today, plan to complete 7 day course  - Continue O2 supplementation  - Full dose AC in setting of COVID19 pneumonia, marked DDimer elevation (also has afib)  - Monitor LFTs  - F/u with ID    Afib w/ RVR  In setting of acute infection with COVID19, pneumonia  - Continue rate control with Metoprolol  - Full dose AC ok as per Neurosurgery    Constipation  Multi factorial in setting of acute illness, diminished mobility, low water intake, opioid use  - Continue bowel regimen, monitor for BM    BPH w/ retention  Urinary retention earlier this admission, since resolved. Passed trial of void  - Continue tamsulosin, finasteride    Hx of fall, L hip fracture, ORIF 9/2024  Stable  - Continue pain control as needed  - PT restorative sessions      Palliative Care consulted to assist with GOC  
89 year old male w recent dx AFIB, Alzheimer dementia,  recent L SDH and L hip fx ORIF 09-, BPH urinary retention w gallegos -BIBEMS for weakness and fatigue Recent adm 09-29 to 10- adm to ICU s/p fall from standing on to carpeted floor sustaining L hip fx and L SDH.  DC to [facility] for rehab on lovenox 40 mg qd x 4 weeks Recently at [facility] rehab where he contracted COVID +fever +SOB +weakness.  History obtained from family at bedside when they were in ED - In ED /91   HR 94   RR 17   T 97.5   93% sat RA AFIB w , O2 89% RA,  96% with 2 L nc  - NS 1000 cc CXR neg. Being admitted to our service for AHRF d/t COVID-19, AFIB w RVR, imaging shows Patchy opacities in the posterior segments of both upper lobes as well as within the right middle and right lower lobes are noted as described above. Exact etiology is unclear. One of the differential diagnostic consideration includes dependent atelectasis. Started on IV abx - remdesivir, decadron, abx.     1. Acute respiratory failure. Covid-19 Viral Syndrome. Multifocal pneumonia. Alzheimers dementia. BPH/Urinary retention. VICTORIA  - imaging reviewed  - on remdesivir, decadron #5  - on rocephin/doxycycline #5 for superimposed bacterial pna coverage  - continue with above abx coverage   - monitor temps  - tolerating abx well so far; no side effects noted  - reason for abx use and side effects reviewed with patient  - supportive care  - fu cbc  - monitor temps  - fu cultures    Clinical team may change from intravenous to oral antibiotics when the following criteria are met:   1. Patient is clinically improving/stable       a)	Improved signs and symptoms of infection from initial presentation       b)	Afebrile for 24 hours       c)	Leukocytosis trending towards normal range   2. Patient is tolerating oral intake   3. Initial/repeat blood cultures are negative   .  - when above met change to po doxycycline x 5 days total, po ceftin x 7days      
89yom, pmhx alzheimer dementia, s/p ORIF for L hip fx and SDH 09-, urinary retention, and ICH (2018), admitted for COVID-19 active infxn and Afib w/ RVR. Pt. has no acute complaints today. Pt daughter (healthcare proxy) states pt has not had a BM since friday. Imaging notable for acute subdural hematoma on CT head and patchy opacities in the upper lobes b/l and R middle and lower lobes of the lungs of unknown etiology. Labs notable for leukocytosis and elevated creatinine. HR elevated all other VSS and WNL.     #AHRF, likely 2/2 COVID  #Superimposed PNA  -c/w remedesivir and decadron  -c/w PRN albuterol for sob  -Monitor daily labs  -c/w isolation protocols  -supplemental O2 prn  -c/w rocephin/doxy for empiric coverage    #Afib w/ RVR  Pt has h/o SDH 3 weeks ago, bleed seen on recent CT head most likely evidence of prior SDH per NSx  HR intermittently elevated today  Troponins negative  -c/w metoprolol  -Per NSx and family c/w lovenox   -cardio following    #Constipation 2/2 opoid use  -C/w miralax  -c/w senna    #BPH w/ retention  -c/w tamsulosin  -c/w finasteride    #S/p L hip fx and ORIF 2/2 fall 09-29-24  -c/w prn Oxycodone for pain management  -PT following    VTE ppx: Lovenox  Diet: DASH/TLC sodium/cholesterol restricted  FULL CODE (spoke with daughter regarding, she is speaking with family to determine how to proceed, Palliative consulted to discuss further with family)      
89yom, pmhx alzheimer dementia, s/p ORIF for L hip fx and SDH 09-, urinary retention, and ICH (2018), admitted for COVID-19 active infxn and Afib w/ RVR. Pt. has no acute complaints today. Pt daughter (healthcare proxy) states pt has not had a BM since friday. Imaging notable for acute subdural hematoma on CT head and patchy opacities in the upper lobes b/l and R middle and lower lobes of the lungs of unknown etiology. Labs notable for leukocytosis and elevated creatinine. HR elevated all other VSS and WNL.     #AHRF, likely 2/2 COVID, superimpossed PNA, unable to determine if gram negative in etiology   -c/w remedesivir and decadron  -Doxy/Rocephin   -supplemental oxygen PRN   -monitor LFTs and renal function   -ID following       #Afib w/ RVR  -Pt has h/o SDH 3 weeks ago, bleed seen on recent CT head most likely evidence of prior SDH per NSx  -Lovenox with plan to transition to doac on discharge   -Metoprolol   -cardiology following     #Constipation 2/2 opoid use  -C/w miralax  -c/w senna    #BPH w/ retention  -c/w tamsulosin  -c/w finasteride  -gallegos removed during this admission     #S/p L hip fx and ORIF 2/2 fall 09-29-24  -c/w prn Oxycodone for pain management  -PT following    VTE ppx: Lovenox  Diet: DASH/TLC sodium/cholesterol restricted    #Advanced Care Directives   -FULL CODE   -Palliative following     
A/P: 89 demented male Likely septic POA, unclear source, anemia  L SDH      Plan:  CCU    PULM: Continue HF NC O2, DOXY and Zosyn for possible PNA    Cardio Likely from septic shock, Suraj Capped at 1    Renal VICTORIA likely from ATN, still hypernatremic    GI: NPO    Patient DNR/DNI, no escalating of pressors.  I called the patients daughter Crow and we discussed transitioning to Comfort Care.  Im awaiting a callback   
A/P: 89 demented male Likely septic POA, unclear source, anemia  L SDH      Plan:  CCU    PULM: Continue HF NC O2, DOXY and Zosyn for possible PNA    Cardio likely from septic shock, Suraj Capped at 1    Renal VICTORIA likely from ATN, Continue IVF    GI: NPO    Patient DNR/DNI, no escalating of pressors  
A:    89M    Here for:    1. AHRF  2. Septic shock 2/2  3. COVID PNA, with likely  4. Superimposed bacterial PNA  5. MSOF  6. VICTORIA  7. Anemia  8. SIGRID    This patient requires critical care for support of one or more vital organ systems with a high probability of imminent or life threatening deterioration in his/her condition    P:    MSOF 2/2 COVID PNA, suspected superimposed bacterial PNA  Organ systems failing; CNS, renal, cardiac, pulmonary, hematologic  Acute decompensation this AM    Now MOLST; DNR/I    Sepsis bundle  HFNC, actively titrate to SpO2 > 90% and patient comfort  Initiate vasopressor therapy; phenylephrine   SIGRID, Hx of AF; cannot tolerate AVNB therapy at this time, may require amiodarone; EF ~ 60%  pip/tazo, change to doxy IV; white count rising  Hgb drip 4-5g in 2 days s/p 1u pRBC now, consent obtained, send stool guiac  VICTORIA, triple of Cr over several days 2/2 ATN from sepsis; hydrate, monitor UOP, may require gallegos  d/c lovenox given anemia  f/u lytes, replete PRN  IVF resuscitation  Avoid painful procedure per family, so run pressors peripherally as much as safe and possible    Discussed with son Ziggy Elias  and dtr Crow . We discussed acute events, worsening of status, and plan of care. Crow expresses anger and blame towards NH about his condition. He has had poor intake for weeks. We discussed his multi system organ failure and precipitous condition; I explained that from our view, Frederic is dying; his organs are failing, and his condition is worsening rapidly. He is 89 yo with dementia, a subacute SDH from a fall and COVID. After discussion among family, decision made to set limitation of DNR/DNI, otherwise continue to treat. They will try to come in, weighing risk of being exposed to COVID. EROS placed in chart.    Dispo: Admit to critical care. Continue resuscitation with limits of DNR/I, continue aggressive medical treatment beyond this.     Discussed with Dr Rowland    Date of entry of this note is equal to the date of services rendered    TOTAL CRITICAL CARE TIME: 32 minutes (EXCLUSIVE of any non bundled procedures)    Note: This is a critically ill patient. Time spent has been in salvage of life, limb and vital organ systems. This time INCLUDES time spent directly as this patient's bedside with evaluation and management, review of chart including review of laboratory and imaging studies, interpretation of vital signs and cardiac output measurements, any necessary ventilator management, and time spent discussing plan of care with patient and family, including goals of care discussion. This also includes time spent in multidisciplinary discussion with care team and various consultants to optimize treatment plan. This time may NOT include various procedures, which will be noted separately   
Process of Care  --Reviewed dx/treatment problems and alignment with Goals of Care       resp failure/somnolence /   now upgraded to CCU   pt currently DNR DNI     COVID 19  c/w remdesivir  cw demethasone  prn albuterol   on isolation    afib rvr  rate regular today  monitor and continue w rate control  pt on AC     SDH   neurosurgery aware- prior sdh and not signs of acute    Physical Aspects of Care     --Bowel Regimen  risk for constipation d/t immobility  daily dulcolax    --Dyspnea  sp rrt on hfnc now in ccu  monitor symptoms  reC: morphine 2mg IV [id number] prn fro severe sob      --Nausea Vomiting  denies    --Weakness  PT as tolerated     Psychological and Psychiatric Aspects of Care:   --Greif/Bereavment: emotional support provided  --Hx of psychiatric dx: none  -Pastoral Care Available PRN     Social Aspects of Care  -SW involved     Cultural Aspects  -Primary Language: English    Goals of Care:     We discussed Palliative Care team being a supportive team when a patient has ongoing illnesses.  We also discussed that it is not an end of life care service, but can help navigate symptoms and emotional support througout their hospital stay here.    Hospice was explained as well  as an end of life care philosophy.  When a disease cannot be cured, or family/patient decide the treatment burdens out weigh the risk and one choses to change focus of treatment from cure to quality/comfort.        Prog: pt likely has hours to days at best     Ethical and Legal Aspects:   NA        Capacity: none  HCP/Surrogate:Daphne ([phone])    Code Status: dnr dni  MOLST: dnr dni   Dispo Plan: as per CCU    Discussed With: Case coordinated with attending and SW and RN     Time Spent: 55 minutes including the care, coordination and counseling of this patient, 50% of which was spent coordinating and counseling. 
A/P: 89 demented male Likely septic POA, unclear source, anemia  L SDH      Plan:  CCU    PULM: Continue HF NC O2, DOXY and Zosyn for possible PNA    Cardio Likely from septic shock, Suraj Capped at 1    Renal VICTORIA likely from ATN, still hypernatremic    GI: NPO    Patient DNR/DNI, no escalating of pressors.  Had a meeting with the family yesterday and will move to comfort care today

## 2024-10-30 NOTE — DISCHARGE NOTE FOR THE EXPIRED PATIENT - HOSPITAL COURSE
89 y/o M with pmhx of afib, Alzheimer's dementia, L hip fracture s/p ORIF, subdural hematoma 9/29/24, wo presented to  with fever, dyspnea, weakness, found to have acute hypoxic respiratory failure secondary to COVID-19 viral pneumonia with suspected superimposed bacterial pneumonia. CT chest imaging consistent with COVID19 pneumonia. CT head demonstrated subdural hematoma. On initial presentation with hypernatremia and VICTORIA in the setting of dehydration, hypovolemia. Developed distributive shock, acute anemia, AFib RVR, worsening encephalopathy.

## 2024-11-02 LAB
CULTURE RESULTS: SIGNIFICANT CHANGE UP
CULTURE RESULTS: SIGNIFICANT CHANGE UP
SPECIMEN SOURCE: SIGNIFICANT CHANGE UP
SPECIMEN SOURCE: SIGNIFICANT CHANGE UP

## 2024-11-04 DIAGNOSIS — R65.21 SEVERE SEPSIS WITH SEPTIC SHOCK: ICD-10-CM

## 2024-11-04 DIAGNOSIS — R33.9 RETENTION OF URINE, UNSPECIFIED: ICD-10-CM

## 2024-11-04 DIAGNOSIS — G93.49 OTHER ENCEPHALOPATHY: ICD-10-CM

## 2024-11-04 DIAGNOSIS — A41.89 OTHER SPECIFIED SEPSIS: ICD-10-CM

## 2024-11-04 DIAGNOSIS — N17.0 ACUTE KIDNEY FAILURE WITH TUBULAR NECROSIS: ICD-10-CM

## 2024-11-04 DIAGNOSIS — J15.9 UNSPECIFIED BACTERIAL PNEUMONIA: ICD-10-CM

## 2024-11-04 DIAGNOSIS — I48.91 UNSPECIFIED ATRIAL FIBRILLATION: ICD-10-CM

## 2024-11-04 DIAGNOSIS — G30.9 ALZHEIMER'S DISEASE, UNSPECIFIED: ICD-10-CM

## 2024-11-04 DIAGNOSIS — F02.80 DEMENTIA IN OTHER DISEASES CLASSIFIED ELSEWHERE, UNSPECIFIED SEVERITY, WITHOUT BEHAVIORAL DISTURBANCE, PSYCHOTIC DISTURBANCE, MOOD DISTURBANCE, AND ANXIETY: ICD-10-CM

## 2024-11-04 DIAGNOSIS — T40.2X5A ADVERSE EFFECT OF OTHER OPIOIDS, INITIAL ENCOUNTER: ICD-10-CM

## 2024-11-04 DIAGNOSIS — Z96.0 PRESENCE OF UROGENITAL IMPLANTS: ICD-10-CM

## 2024-11-04 DIAGNOSIS — J98.11 ATELECTASIS: ICD-10-CM

## 2024-11-04 DIAGNOSIS — U07.1 COVID-19: ICD-10-CM

## 2024-11-04 DIAGNOSIS — Z79.01 LONG TERM (CURRENT) USE OF ANTICOAGULANTS: ICD-10-CM

## 2024-11-04 DIAGNOSIS — N40.1 BENIGN PROSTATIC HYPERPLASIA WITH LOWER URINARY TRACT SYMPTOMS: ICD-10-CM

## 2024-11-04 DIAGNOSIS — Z78.1 PHYSICAL RESTRAINT STATUS: ICD-10-CM

## 2024-11-04 DIAGNOSIS — D64.9 ANEMIA, UNSPECIFIED: ICD-10-CM

## 2024-11-04 DIAGNOSIS — Z51.5 ENCOUNTER FOR PALLIATIVE CARE: ICD-10-CM
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
## 2024-11-04 DIAGNOSIS — H91.90 UNSPECIFIED HEARING LOSS, UNSPECIFIED EAR: ICD-10-CM

## 2024-11-04 DIAGNOSIS — Z74.1 NEED FOR ASSISTANCE WITH PERSONAL CARE: ICD-10-CM

## 2024-11-04 DIAGNOSIS — E87.0 HYPEROSMOLALITY AND HYPERNATREMIA: ICD-10-CM

## 2024-11-04 DIAGNOSIS — E43 UNSPECIFIED SEVERE PROTEIN-CALORIE MALNUTRITION: ICD-10-CM

## 2024-11-04 DIAGNOSIS — K59.00 CONSTIPATION, UNSPECIFIED: ICD-10-CM
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
## 2024-11-04 DIAGNOSIS — J12.82 PNEUMONIA DUE TO CORONAVIRUS DISEASE 2019: ICD-10-CM

## 2024-11-04 DIAGNOSIS — S06.5XAA TRAUMATIC SUBDURAL HEMORRHAGE WITH LOSS OF CONSCIOUSNESS STATUS UNKNOWN, INITIAL ENCOUNTER: ICD-10-CM

## 2024-11-04 DIAGNOSIS — J96.01 ACUTE RESPIRATORY FAILURE WITH HYPOXIA: ICD-10-CM
